# Patient Record
Sex: FEMALE | Race: WHITE | URBAN - METROPOLITAN AREA
[De-identification: names, ages, dates, MRNs, and addresses within clinical notes are randomized per-mention and may not be internally consistent; named-entity substitution may affect disease eponyms.]

---

## 2018-05-03 ENCOUNTER — DOCTOR'S OFFICE (OUTPATIENT)
Dept: URBAN - METROPOLITAN AREA CLINIC 135 | Facility: CLINIC | Age: 38
Setting detail: OPHTHALMOLOGY
End: 2018-05-03
Payer: COMMERCIAL

## 2018-05-03 DIAGNOSIS — H04.123: ICD-10-CM

## 2018-05-03 DIAGNOSIS — H52.13: ICD-10-CM

## 2018-05-03 DIAGNOSIS — H52.223: ICD-10-CM

## 2018-05-03 PROCEDURE — 92014 COMPRE OPH EXAM EST PT 1/>: CPT | Performed by: OPTOMETRIST

## 2018-05-03 ASSESSMENT — REFRACTION_AUTOREFRACTION
OD_CYLINDER: -1.50
OS_AXIS: 106
OS_SPHERE: -6.00
OD_SPHERE: -4.50
OS_CYLINDER: -0.50
OD_AXIS: 087

## 2018-05-03 ASSESSMENT — REFRACTION_MANIFEST
OD_VA3: 20/
OD_VA1: 20/
OS_VA2: 20/
OS_VA3: 20/
OS_VA3: 20/
OU_VA: 20/
OS_VA1: 20/
OS_VA1: 20/
OU_VA: 20/
OD_VA2: 20/
OD_VA3: 20/
OD_VA2: 20/
OS_VA2: 20/
OD_VA1: 20/

## 2018-05-03 ASSESSMENT — REFRACTION_OUTSIDERX
OU_VA: 20/20
OS_SPHERE: -6.75
OS_CYLINDER: -0.75
OD_VA3: 20/
OS_VA2: 20/20
OS_VA3: 20/
OS_VA1: 20/20
OS_AXIS: 110
OD_CYLINDER: -1.50
OD_VA1: 20/20
OD_AXIS: 085
OD_SPHERE: -4.75
OD_VA2: 20/20

## 2018-05-03 ASSESSMENT — SPHEQUIV_DERIVED
OD_SPHEQUIV: -5.25
OS_SPHEQUIV: -6.25

## 2018-05-03 ASSESSMENT — REFRACTION_CURRENTRX
OD_OVR_VA: 20/
OD_AXIS: 080
OS_OVR_VA: 20/
OS_OVR_VA: 20/
OS_CYLINDER: -0.75
OS_SPHERE: -6.75
OD_CYLINDER: -1.25
OD_OVR_VA: 20/
OD_SPHERE: -5.25
OS_SPHERE: -6.50
OS_AXIS: 110
OD_SPHERE: -5.25
OS_OVR_VA: 20/
OD_OVR_VA: 20/

## 2018-05-03 ASSESSMENT — TEAR BREAK UP TIME (TBUT)
OS_TBUT: 2+
OD_TBUT: 2+

## 2018-05-03 ASSESSMENT — VISUAL ACUITY
OD_BCVA: 20/20-1
OS_BCVA: 20/25

## 2018-05-03 ASSESSMENT — DECREASING TEAR LAKE - SEVERITY SCORE
OD_DEC_TEARLAKE: 1+
OS_DEC_TEARLAKE: 1+

## 2018-05-03 ASSESSMENT — CONFRONTATIONAL VISUAL FIELD TEST (CVF)
OD_FINDINGS: FULL
OS_FINDINGS: FULL

## 2018-05-04 ENCOUNTER — OPTICAL OFFICE (OUTPATIENT)
Dept: URBAN - METROPOLITAN AREA CLINIC 140 | Facility: CLINIC | Age: 38
Setting detail: OPHTHALMOLOGY
End: 2018-05-04
Payer: COMMERCIAL

## 2018-05-04 DIAGNOSIS — H52.223: ICD-10-CM

## 2018-05-04 PROCEDURE — V2107 SPHEROCYLINDER 4.25D/12-2D: HCPCS | Performed by: OPTOMETRIST

## 2018-05-04 PROCEDURE — V2783 LENS, >= 1.66 P/>=1.80 G: HCPCS | Performed by: OPTOMETRIST

## 2018-05-04 PROCEDURE — V2750 ANTI-REFLECTIVE COATING: HCPCS | Performed by: OPTOMETRIST

## 2018-05-04 PROCEDURE — V2020 VISION SVCS FRAMES PURCHASES: HCPCS | Performed by: OPTOMETRIST

## 2018-07-25 ENCOUNTER — OFFICE VISIT (OUTPATIENT)
Dept: SURGERY | Facility: CLINIC | Age: 38
End: 2018-07-25
Payer: COMMERCIAL

## 2018-07-25 VITALS
HEIGHT: 70 IN | OXYGEN SATURATION: 98 % | SYSTOLIC BLOOD PRESSURE: 118 MMHG | HEART RATE: 86 BPM | WEIGHT: 158 LBS | TEMPERATURE: 98.2 F | DIASTOLIC BLOOD PRESSURE: 78 MMHG | BODY MASS INDEX: 22.62 KG/M2

## 2018-07-25 DIAGNOSIS — N60.02 BREAST CYST, LEFT: Primary | ICD-10-CM

## 2018-07-25 PROCEDURE — 99203 OFFICE O/P NEW LOW 30 MIN: CPT | Mod: 25 | Performed by: SURGERY

## 2018-07-25 PROCEDURE — 10022 PR FINE NEEDLE ASP;W/IMAGING GUIDANCE: CPT | Performed by: SURGERY

## 2018-07-25 ASSESSMENT — ENCOUNTER SYMPTOMS
HEADACHES: 1
CARDIOVASCULAR NEGATIVE: 1
ENDOCRINE NEGATIVE: 1
COUGH: 1
CONSTITUTIONAL NEGATIVE: 1
EYES NEGATIVE: 1
CONSTIPATION: 1
MUSCULOSKELETAL NEGATIVE: 1
ALLERGIC/IMMUNOLOGIC NEGATIVE: 1
HEMATOLOGIC/LYMPHATIC NEGATIVE: 1
PSYCHIATRIC NEGATIVE: 1
DIARRHEA: 1

## 2018-07-25 NOTE — LETTER
2018     Arron Espinoza MD  [address]  [address]  [facility] [address]    Patient: Ann Rob   YOB: 1980   Date of Visit: 2018       Dear Dr. Espinoza:    Thank you for referring Ann Rob to me for evaluation. Below are my notes for this consultation.    If you have questions, please do not hesitate to call me. I look forward to following your patient along with you.         Sincerely,        Gilberto Carty MD        CC: MD Maribell Gunter MD  2018  3:55 PM  Attested  Patient ID: Ann Rob                              : 1980    Visit Date: [unfilled]  Referring Provider: No ref. provider found  PCP: Mony Dorado MD  GYN: No care team member to display    Subjective       Chief Complaint: Breast Mass (patient palpated left breast lump 07/15/2018. Patient feels lump has enlarged since.)    Ann Rob is a 37 y.o. old female presenting today for evaluation of a left breast lump which she palpated on .  She then saw her Gyn who also palpated the mass and recommended she go for evaluation by a breast surgeon.  She reported that she was told the mass was about 3 cm in size.  She also reports that she thinks the mass has gotten bigger.  She does have some tenderness at times in the area of the mass.  She denies previous breast biopsies or breast surgery. She reports a family history of breast cancer in her maternal great grandmother.  Her maternal grandmother had uterine cancer.      Breast Health:  Age at menarche: 15  Age at first live birth: 26   Age at menopause: No age on file  Breastfeeding? Stopped in May   HRT: NO  Fertility Tx: NO  OCP: NO    Medications: has a current medication list which includes the following prescription(s): calcium and multivitamin.    Allergies: has No Known Allergies.    Family History: family history includes Breast cancer in her other; Colon cancer in her father's brother; Diabetes  "in her maternal grandfather, mother's brother, and paternal grandmother; Heart disease in her father; Hypertension in her father and paternal grandfather; Prostate cancer in her father; Uterine cancer in her maternal grandmother.  Past Medical History:  has a past medical history of Gestational diabetes.  Past Surgical History:  has no past surgical history on file.  Social History:  reports that she has never smoked. She has never used smokeless tobacco. She reports that she does not drink alcohol or use drugs.       Review of Systems   Constitutional: Negative.    HENT: Negative.    Eyes: Negative.    Respiratory: Positive for cough.    Cardiovascular: Negative.    Gastrointestinal: Positive for constipation and diarrhea.   Endocrine: Negative.    Genitourinary: Negative.    Musculoskeletal: Negative.    Skin: Negative.    Allergic/Immunologic: Negative.    Neurological: Positive for headaches.   Hematological: Negative.    Psychiatric/Behavioral: Negative.      The following have been reviewed and updated as appropriate in this visit:         Objective   Vitals: /78 (BP Location: Right upper arm, Patient Position: Sitting)   Pulse 86   Temp 36.8 °C (98.2 °F) (Temporal)   Ht 1.772 m (5' 9.75\")   Wt 71.7 kg (158 lb)   LMP 07/21/2018 (Approximate)   SpO2 98%   Breastfeeding? No   BMI 22.83 kg/m²      Physical Exam   Constitutional: She is oriented to person, place, and time. She appears well-developed and well-nourished.   HENT:   Head: Normocephalic and atraumatic.   Eyes: EOM are normal. Pupils are equal, round, and reactive to light.   Neck: Normal range of motion. Neck supple.   Cardiovascular: Normal rate.    Pulmonary/Chest: Effort normal. Right breast exhibits no inverted nipple, no mass, no nipple discharge, no skin change and no tenderness. Left breast exhibits mass and tenderness. Left breast exhibits no inverted nipple, no nipple discharge and no skin change.       Musculoskeletal: Normal " range of motion.   Neurological: She is alert and oriented to person, place, and time.   Skin: Skin is warm and dry.   Vitals reviewed.         Breast Imaging: Office ultrasound performed showing 3 cm x 2 cm cyst at 4-5:00, 3 cmfn     Assessment/Plan   Problem List Items Addressed This Visit     None      Visit Diagnoses     Breast cyst, left    -  Primary        - Aspiration of the left breast cyst was performed in the office   - The patient was instructed to follow up in the office in 6 months        Return in about 6 months (around 1/25/2019).    Maribell Andino MD    Attestation signed by Gilberto Carty MD at 7/30/2018  7:42 AM:  I personally reviewed the documentation and performed a physical exam.  I agree with this assessment.

## 2018-07-25 NOTE — PROGRESS NOTES
Patient ID: Ann Rob                              : 1980    Visit Date: [unfilled]  Referring Provider: No ref. provider found  PCP: Mony Dorado MD  GYN: No care team member to display    Subjective       Chief Complaint: Breast Mass (patient palpated left breast lump 07/15/2018. Patient feels lump has enlarged since.)    Ann Rob is a 37 y.o. old female presenting today for evaluation of a left breast lump which she palpated on .  She then saw her Gyn who also palpated the mass and recommended she go for evaluation by a breast surgeon.  She reported that she was told the mass was about 3 cm in size.  She also reports that she thinks the mass has gotten bigger.  She does have some tenderness at times in the area of the mass.  She denies previous breast biopsies or breast surgery. She reports a family history of breast cancer in her maternal great grandmother.  Her maternal grandmother had uterine cancer.      Breast Health:  Age at menarche: 15  Age at first live birth: 26   Age at menopause: No age on file  Breastfeeding? Stopped in May   HRT: NO  Fertility Tx: NO  OCP: NO    Medications: has a current medication list which includes the following prescription(s): calcium and multivitamin.    Allergies: has No Known Allergies.    Family History: family history includes Breast cancer in her other; Colon cancer in her father's brother; Diabetes in her maternal grandfather, mother's brother, and paternal grandmother; Heart disease in her father; Hypertension in her father and paternal grandfather; Prostate cancer in her father; Uterine cancer in her maternal grandmother.  Past Medical History:  has a past medical history of Gestational diabetes.  Past Surgical History:  has no past surgical history on file.  Social History:  reports that she has never smoked. She has never used smokeless tobacco. She reports that she does not drink alcohol or use drugs.       Review of Systems  "  Constitutional: Negative.    HENT: Negative.    Eyes: Negative.    Respiratory: Positive for cough.    Cardiovascular: Negative.    Gastrointestinal: Positive for constipation and diarrhea.   Endocrine: Negative.    Genitourinary: Negative.    Musculoskeletal: Negative.    Skin: Negative.    Allergic/Immunologic: Negative.    Neurological: Positive for headaches.   Hematological: Negative.    Psychiatric/Behavioral: Negative.      The following have been reviewed and updated as appropriate in this visit:         Objective   Vitals: /78 (BP Location: Right upper arm, Patient Position: Sitting)   Pulse 86   Temp 36.8 °C (98.2 °F) (Temporal)   Ht 1.772 m (5' 9.75\")   Wt 71.7 kg (158 lb)   LMP 07/21/2018 (Approximate)   SpO2 98%   Breastfeeding? No   BMI 22.83 kg/m²     Physical Exam   Constitutional: She is oriented to person, place, and time. She appears well-developed and well-nourished.   HENT:   Head: Normocephalic and atraumatic.   Eyes: EOM are normal. Pupils are equal, round, and reactive to light.   Neck: Normal range of motion. Neck supple.   Cardiovascular: Normal rate.    Pulmonary/Chest: Effort normal. Right breast exhibits no inverted nipple, no mass, no nipple discharge, no skin change and no tenderness. Left breast exhibits mass and tenderness. Left breast exhibits no inverted nipple, no nipple discharge and no skin change.       Musculoskeletal: Normal range of motion.   Neurological: She is alert and oriented to person, place, and time.   Skin: Skin is warm and dry.   Vitals reviewed.         Breast Imaging: Office ultrasound performed showing 3 cm x 2 cm cyst at 4-5:00, 3 cmfn     Assessment/Plan   Problem List Items Addressed This Visit     None      Visit Diagnoses     Breast cyst, left    -  Primary        - Aspiration of the left breast cyst was performed in the office   - The patient was instructed to follow up in the office in 6 months        Return in about 6 months (around " 1/25/2019).    Maribell Andino MD

## 2018-09-14 ENCOUNTER — HOSPITAL ENCOUNTER (EMERGENCY)
Facility: HOSPITAL | Age: 38
Discharge: HOME | End: 2018-09-14
Attending: EMERGENCY MEDICINE
Payer: COMMERCIAL

## 2018-09-14 VITALS
RESPIRATION RATE: 18 BRPM | SYSTOLIC BLOOD PRESSURE: 112 MMHG | DIASTOLIC BLOOD PRESSURE: 65 MMHG | OXYGEN SATURATION: 98 % | WEIGHT: 161 LBS | HEIGHT: 69 IN | BODY MASS INDEX: 23.85 KG/M2 | TEMPERATURE: 98.1 F | HEART RATE: 88 BPM

## 2018-09-14 DIAGNOSIS — Z04.1 EXAM FOLLOWING MVC (MOTOR VEHICLE COLLISION), NO APPARENT INJURY: Primary | ICD-10-CM

## 2018-09-14 PROCEDURE — 99283 EMERGENCY DEPT VISIT LOW MDM: CPT

## 2018-09-14 PROCEDURE — 63700000 HC SELF-ADMINISTRABLE DRUG: Performed by: STUDENT IN AN ORGANIZED HEALTH CARE EDUCATION/TRAINING PROGRAM

## 2018-09-14 RX ORDER — IBUPROFEN 600 MG/1
600 TABLET ORAL ONCE
Status: COMPLETED | OUTPATIENT
Start: 2018-09-14 | End: 2018-09-14

## 2018-09-14 RX ORDER — ACETAMINOPHEN 325 MG/1
650 TABLET ORAL ONCE
Status: COMPLETED | OUTPATIENT
Start: 2018-09-14 | End: 2018-09-14

## 2018-09-14 RX ADMIN — ACETAMINOPHEN 650 MG: 325 TABLET, FILM COATED ORAL at 12:45

## 2018-09-14 RX ADMIN — IBUPROFEN 600 MG: 600 TABLET ORAL at 12:45

## 2018-09-14 ASSESSMENT — ENCOUNTER SYMPTOMS
VOMITING: 0
PALPITATIONS: 0
ABDOMINAL PAIN: 0
MYALGIAS: 1
NECK STIFFNESS: 0
ARTHRALGIAS: 1
COLOR CHANGE: 0
SEIZURES: 0
HEMATURIA: 0
FEVER: 0
CHILLS: 0
DYSURIA: 0
EYE PAIN: 0
NECK PAIN: 1
SHORTNESS OF BREATH: 0
BACK PAIN: 0
SORE THROAT: 0
COUGH: 0
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
## 2019-01-28 ENCOUNTER — OFFICE VISIT (OUTPATIENT)
Dept: SURGERY | Facility: CLINIC | Age: 39
End: 2019-01-28
Payer: COMMERCIAL

## 2019-01-28 VITALS
DIASTOLIC BLOOD PRESSURE: 78 MMHG | HEART RATE: 67 BPM | HEIGHT: 69 IN | OXYGEN SATURATION: 98 % | TEMPERATURE: 99 F | WEIGHT: 163 LBS | BODY MASS INDEX: 24.14 KG/M2 | SYSTOLIC BLOOD PRESSURE: 114 MMHG

## 2019-01-28 DIAGNOSIS — N63.0 LUMP OR MASS IN BREAST: Primary | ICD-10-CM

## 2019-01-28 PROCEDURE — 99213 OFFICE O/P EST LOW 20 MIN: CPT | Performed by: SURGERY

## 2019-02-14 ASSESSMENT — ENCOUNTER SYMPTOMS
FREQUENCY: 0
UNEXPECTED WEIGHT CHANGE: 0
ARTHRALGIAS: 0
HEADACHES: 0
FATIGUE: 0
PHOTOPHOBIA: 0
DECREASED CONCENTRATION: 0
AGITATION: 0
WHEEZING: 0
ADENOPATHY: 0
SHORTNESS OF BREATH: 0
BRUISES/BLEEDS EASILY: 0
TROUBLE SWALLOWING: 0
NERVOUS/ANXIOUS: 0
ABDOMINAL PAIN: 0
VOICE CHANGE: 0
HEMATURIA: 0
CHEST TIGHTNESS: 0
PALPITATIONS: 0
BLOOD IN STOOL: 0
STRIDOR: 0
WEAKNESS: 0
JOINT SWELLING: 0
COLOR CHANGE: 0
FLANK PAIN: 0
MYALGIAS: 0
BACK PAIN: 0
CONFUSION: 0

## 2019-02-14 NOTE — PROGRESS NOTES
Patient ID: Ann Rob                              : 1980    Visit Date: 19  Referring Provider: No ref. provider found  PCP: Norma Gill MD  GYN: No care team member to display    Subjective     Chief Complaint: f/u left breast cyst      Ann Rob is a 38 y.o. old female presenting today for follow-up for left breast lump.  The patient presented in May 2018 with a mass in her left breast in the lower outer quadrant measuring approximately 3 cm.  That time office ultrasound demonstrated a cyst corresponding with a tender mass.  Ultrasound-guided core aspiration was performed in the office with the resolution.  She now presents for short-term interval follow-up.  She is completed lactation in May 2018.  There is been no other interval change to her breast history.       Review of Systems   Constitutional: Negative for fatigue and unexpected weight change.   HENT: Negative for trouble swallowing and voice change.    Eyes: Negative for photophobia and visual disturbance.   Respiratory: Negative for chest tightness, shortness of breath, wheezing and stridor.    Cardiovascular: Negative for chest pain, palpitations and leg swelling.   Gastrointestinal: Negative for abdominal pain and blood in stool.   Endocrine: Negative for cold intolerance, heat intolerance and polyuria.   Genitourinary: Negative for flank pain, frequency and hematuria.   Musculoskeletal: Negative for arthralgias, back pain, joint swelling and myalgias.   Skin: Negative for color change and rash.   Neurological: Negative for syncope, weakness and headaches.   Hematological: Negative for adenopathy. Does not bruise/bleed easily.   Psychiatric/Behavioral: Negative for agitation, confusion and decreased concentration. The patient is not nervous/anxious.          Medications: has a current medication list which includes the following prescription(s): calcium and multivitamin.      Allergies: has No Known Allergies.    Family History:  "  Family History   Problem Relation Age of Onset   • Prostate cancer Father    • Heart disease Father    • Hypertension Father    • Uterine cancer Maternal Grandmother    • Diabetes Maternal Grandfather    • Diabetes Paternal Grandmother    • Hypertension Paternal Grandfather    • Breast cancer Other    • Colon cancer Father's Brother    • Diabetes Mother's Brother        Past Medical History:  has a past medical history of Gestational diabetes.    Past Surgical History:  has no past surgical history on file.    Social History:  reports that she has never smoked. She has never used smokeless tobacco. She reports that she does not drink alcohol or use drugs.       The following have been reviewed and updated as appropriate in this visit:  Allergies  Meds  Problems         Objective   Vitals: /78 (BP Location: Left upper arm, Patient Position: Sitting)   Pulse 67   Temp 37.2 °C (99 °F) (Temporal)   Ht 1.753 m (5' 9\")   Wt 73.9 kg (163 lb)   SpO2 98%   BMI 24.07 kg/m²     Physical Exam The patient is awake alert.  There is no supraclavicular or cervical adenopathy.  There is no scleral icterus.  There is no thyroid nodules palpable.  There is no axillary adenopathy.  The breasts are symmetrical bilaterally.  There is no dominant mass in either breast, erythema, retraction or finding concerning for malignancy.  In the lower outer left breast, there is a fullness of tissue.  This does not have suspicion for malignancy.  Ultrasound was performed for evaluation.     On ultrasound today, an area of clustering of small cysts and denser tissue was identified in the palpable area in the lower outer left breast this is approximately 4 o'clock position 3 centers of nipple.     Assessment/Plan   Problem List Items Addressed This Visit     None      Visit Diagnoses     Lump or mass in breast    -  Primary        I have recommended short-term interval follow-up with repeat ultrasound in 3 months.  I do not find an " area amenable to ultrasound aspiration in the office at this time.  There is no clinically suspicious findings for malignancy.       Return in about 3 months (around 4/28/2019).      Gilberto Carty MD

## 2019-05-09 ENCOUNTER — DOCTOR'S OFFICE (OUTPATIENT)
Dept: URBAN - METROPOLITAN AREA CLINIC 135 | Facility: CLINIC | Age: 39
Setting detail: OPHTHALMOLOGY
End: 2019-05-09
Payer: COMMERCIAL

## 2019-05-09 DIAGNOSIS — H52.223: ICD-10-CM

## 2019-05-09 DIAGNOSIS — H52.13: ICD-10-CM

## 2019-05-09 PROCEDURE — 92014 COMPRE OPH EXAM EST PT 1/>: CPT | Performed by: OPTOMETRIST

## 2019-05-09 PROCEDURE — 92310 CONTACT LENS FITTING OU: CPT | Performed by: OPTOMETRIST

## 2019-05-09 ASSESSMENT — SPHEQUIV_DERIVED
OD_SPHEQUIV: -5.5
OS_SPHEQUIV: -6.25
OS_SPHEQUIV: -7.125
OD_SPHEQUIV: -5.25

## 2019-05-09 ASSESSMENT — VISUAL ACUITY
OS_BCVA: 20/20
OD_BCVA: 20/20-1

## 2019-05-09 ASSESSMENT — REFRACTION_MANIFEST
OS_VA1: 20/
OD_AXIS: 085
OD_CYLINDER: -1.50
OD_VA2: 20/
OS_VA2: 20/
OS_VA2: 20/20
OD_VA1: 20/
OD_VA2: 20/20
OD_VA1: 20/20
OS_VA1: 20/20
OD_VA3: 20/
OS_CYLINDER: -0.75
OU_VA: 20/20
OS_VA3: 20/
OS_VA3: 20/
OU_VA: 20/
OD_VA3: 20/
OD_SPHERE: -4.75
OS_AXIS: 110
OS_SPHERE: -6.75

## 2019-05-09 ASSESSMENT — REFRACTION_CURRENTRX
OS_OVR_VA: 20/
OD_SPHERE: -5.25
OS_OVR_VA: 20/
OD_OVR_VA: 20/
OS_SPHERE: -6.50
OS_CYLINDER: -0.75
OS_SPHERE: -6.75
OD_SPHERE: -4.75
OD_OVR_VA: 20/
OD_AXIS: 085
OS_AXIS: 110
OS_OVR_VA: 20/
OD_CYLINDER: -1.50
OD_OVR_VA: 20/

## 2019-05-09 ASSESSMENT — DECREASING TEAR LAKE - SEVERITY SCORE
OD_DEC_TEARLAKE: 1+
OS_DEC_TEARLAKE: 1+

## 2019-05-09 ASSESSMENT — REFRACTION_AUTOREFRACTION
OD_AXIS: 087
OS_SPHERE: -6.00
OS_AXIS: 106
OD_SPHERE: -4.50
OS_CYLINDER: -0.50
OD_CYLINDER: -1.50

## 2019-05-09 ASSESSMENT — CONFRONTATIONAL VISUAL FIELD TEST (CVF)
OS_FINDINGS: FULL
OD_FINDINGS: FULL

## 2019-05-09 ASSESSMENT — TEAR BREAK UP TIME (TBUT)
OS_TBUT: 2+
OD_TBUT: 2+

## 2019-05-23 ENCOUNTER — DOCTOR'S OFFICE (OUTPATIENT)
Dept: URBAN - METROPOLITAN AREA CLINIC 135 | Facility: CLINIC | Age: 39
Setting detail: OPHTHALMOLOGY
End: 2019-05-23

## 2019-05-23 DIAGNOSIS — H10.13: ICD-10-CM

## 2019-05-23 PROCEDURE — NO CHARGE N/C PROFESSIONAL COURTESY: Performed by: OPTOMETRIST

## 2019-05-23 ASSESSMENT — VISUAL ACUITY
OS_BCVA: 20/20
OD_BCVA: 20/20-1

## 2019-05-23 ASSESSMENT — REFRACTION_MANIFEST
OD_VA2: 20/
OS_VA1: 20/
OS_AXIS: 110
OD_AXIS: 085
OD_VA1: 20/20
OS_VA3: 20/
OS_CYLINDER: -0.75
OD_VA3: 20/
OS_VA2: 20/
OU_VA: 20/20
OS_SPHERE: -6.75
OD_CYLINDER: -1.50
OD_VA3: 20/
OD_SPHERE: -4.75
OS_VA2: 20/20
OS_VA3: 20/
OD_VA2: 20/20
OS_VA1: 20/20
OU_VA: 20/
OD_VA1: 20/

## 2019-05-23 ASSESSMENT — REFRACTION_AUTOREFRACTION
OS_CYLINDER: -0.50
OD_AXIS: 087
OD_SPHERE: -4.50
OS_AXIS: 106
OD_CYLINDER: -1.50
OS_SPHERE: -6.00

## 2019-05-23 ASSESSMENT — REFRACTION_CURRENTRX
OS_SPHERE: -6.75
OD_SPHERE: -5.25
OS_AXIS: 110
OD_OVR_VA: 20/
OS_CYLINDER: -0.75
OD_SPHERE: -4.75
OD_OVR_VA: 20/
OS_OVR_VA: 20/
OS_OVR_VA: 20/
OS_SPHERE: -6.50
OD_AXIS: 085
OS_OVR_VA: 20/
OD_OVR_VA: 20/
OD_CYLINDER: -1.50

## 2019-05-23 ASSESSMENT — CONFRONTATIONAL VISUAL FIELD TEST (CVF)
OS_FINDINGS: FULL
OD_FINDINGS: FULL

## 2019-05-23 ASSESSMENT — TEAR BREAK UP TIME (TBUT)
OS_TBUT: 2+
OD_TBUT: 2+

## 2019-05-23 ASSESSMENT — DECREASING TEAR LAKE - SEVERITY SCORE
OS_DEC_TEARLAKE: 1+
OD_DEC_TEARLAKE: 1+

## 2019-05-23 ASSESSMENT — SPHEQUIV_DERIVED
OS_SPHEQUIV: -7.125
OD_SPHEQUIV: -5.25
OD_SPHEQUIV: -5.5
OS_SPHEQUIV: -6.25

## 2019-10-29 ENCOUNTER — OFFICE VISIT (OUTPATIENT)
Dept: SURGERY | Facility: CLINIC | Age: 39
End: 2019-10-29
Payer: COMMERCIAL

## 2019-10-29 VITALS
HEIGHT: 69 IN | HEART RATE: 81 BPM | DIASTOLIC BLOOD PRESSURE: 86 MMHG | OXYGEN SATURATION: 99 % | SYSTOLIC BLOOD PRESSURE: 124 MMHG | WEIGHT: 163 LBS | TEMPERATURE: 98.6 F | BODY MASS INDEX: 24.14 KG/M2

## 2019-10-29 DIAGNOSIS — N60.19 FIBROCYSTIC BREAST DISEASE (FCBD), UNSPECIFIED LATERALITY: Primary | ICD-10-CM

## 2019-10-29 PROCEDURE — 99213 OFFICE O/P EST LOW 20 MIN: CPT | Performed by: SURGERY

## 2019-10-29 RX ORDER — DICYCLOMINE HYDROCHLORIDE 10 MG/1
CAPSULE ORAL
Refills: 0 | COMMUNITY
Start: 2019-09-26 | End: 2021-08-11

## 2019-10-29 NOTE — PROGRESS NOTES
History and Physical Follow-up Note       Chief Complaint : The patient presents for a breast check    History of present illness: She has been seen in the past for fibrocystic breast changes.  In May 2018 she had a 3 cm breast cyst aspirated from the lower outer quadrant.  In January 2019 she had no clinical recurrence.  A repeat ultrasound  at that time revealed a cluster of small cyst.  Since that time she has had intermittent bilateral breast discomfort.  This tends to vary with her menstrual cycle.  Her periods have been somewhat irregular.  She has not noticed any new lumps or bumps on self-examination.  She has never had a mammogram.  Her family history significant for her maternal grandmother having had uterine cancer, a maternal great aunt having had breast cancer at 60, her father having had prostate cancer, and a maternal uncle having a colon cancer.  She is considering seeing the genetic counselor.  I encouraged her to do so.  Again her periods have been somewhat irregular of late..     Medical History:   Past Medical History:   Diagnosis Date   • Gestational diabetes        Surgical History: History reviewed. No pertinent surgical history.    Allergies: No Known Allergies    Home Medications:    Current Outpatient Medications:   •  CALCIUM ORAL, Take by mouth., Disp: , Rfl:   •  dicyclomine (BENTYL) 10 mg capsule, TAKE 1 CAPSULE BY ORAL ROUTE UPTO 3 TIMES AS NEEDED FOR ABDOMINAL PAIN ,, Disp: , Rfl: 0  •  multivitamin tablet, Take 1 tablet by mouth daily., Disp: , Rfl:        Vital Signs:   Vitals:    10/29/19 1128   BP: 124/86   Pulse: 81   Temp: 37 °C (98.6 °F)   SpO2: 99%       Physicial Exam  Physical Exam   Constitutional: She is oriented to person, place, and time. She appears well-developed and well-nourished.   HENT:   Head: Normocephalic.   Eyes: EOM are normal.   Neck: Normal range of motion. Neck supple.   Pulmonary/Chest: Effort normal. No respiratory distress. Right breast exhibits no  inverted nipple, no mass, no nipple discharge and no skin change. Left breast exhibits no inverted nipple, no mass, no nipple discharge and no skin change. No breast discharge. Breasts are symmetrical.   Abdominal: Soft. She exhibits no distension.   Musculoskeletal: Normal range of motion.   Neurological: She is alert and oriented to person, place, and time.   Skin: Skin is warm and dry.   Psychiatric: She has a normal mood and affect. Her behavior is normal. Judgment and thought content normal.       .  Bilateral breast palpation reveals symmetric mild to moderate fibrocystic change with no dominant lesion.  Of note there has been no recurrence of the cyst clinically that was aspirated from the lower outer quadrant of the left breast.  There is no regional lymphadenopathy.    Imaging  I have independently reviewed the patient's Imaging.    Assessment/Plan    Problem List Items Addressed This Visit        Other    Fibrocystic breast disease (FCBD) - Primary     The patient has symmetric mild to moderate fibrocystic changes.  There are no dominant lesions.  In particular there has been no recurrence of the cyst that was previously aspirated in the left breast.  She is going to go ahead with genetic screening in view of her family history.  If this is negative she knows to continue with monthly self-examinations, have a baseline mammogram in the next year to and then continue with these yearly after age 40, and continue with yearly physician examinations.  If her genetic testing is positive we will get together and discuss the situation in great detail and discuss appropriate screening.  Under the circumstances she would need an a mammogram in the near future and ultimately a breast MRI.  She understands the plan and is agreeable.                 Nasim Juarez MD  03/08/18

## 2019-10-29 NOTE — ASSESSMENT & PLAN NOTE
The patient has symmetric mild to moderate fibrocystic changes.  There are no dominant lesions.  In particular there has been no recurrence of the cyst that was previously aspirated in the left breast.  She is going to go ahead with genetic screening in view of her family history.  If this is negative she knows to continue with monthly self-examinations, have a baseline mammogram in the next year to and then continue with these yearly after age 40, and continue with yearly physician examinations.  If her genetic testing is positive we will get together and discuss the situation in great detail and discuss appropriate screening.  Under the circumstances she would need an a mammogram in the near future and ultimately a breast MRI.  She understands the plan and is agreeable.

## 2019-10-29 NOTE — LETTER
October 29, 2019     Norma Gill MD  [address]   [address]  [facility] [address]    Patient: Ann Rob  YOB: 1980  Date of Visit: 10/29/2019      Dear Dr. Gill:    Thank you for referring Ann Rob to me for evaluation. Below are my notes for this consultation.    If you have questions, please do not hesitate to call me. I look forward to following your patient along with you.         Sincerely,        Nasim Juarez MD        CC: MD Ann Moore David, MD  10/29/2019 12:31 PM  Signed   History and Physical Follow-up Note       Chief Complaint : The patient presents for a breast check    History of present illness: She has been seen in the past for fibrocystic breast changes.  In May 2018 she had a 3 cm breast cyst aspirated from the lower outer quadrant.  In January 2019 she had no clinical recurrence.  A repeat ultrasound  at that time revealed a cluster of small cyst.  Since that time she has had intermittent bilateral breast discomfort.  This tends to vary with her menstrual cycle.  Her periods have been somewhat irregular.  She has not noticed any new lumps or bumps on self-examination.  She has never had a mammogram.  Her family history significant for her maternal grandmother having had uterine cancer, a maternal great aunt having had breast cancer at 60, her father having had prostate cancer, and a maternal uncle having a colon cancer.  She is considering seeing the genetic counselor.  I encouraged her to do so.  Again her periods have been somewhat irregular of late..     Medical History:   Past Medical History:   Diagnosis Date   • Gestational diabetes        Surgical History: History reviewed. No pertinent surgical history.    Allergies: No Known Allergies    Home Medications:    Current Outpatient Medications:   •  CALCIUM ORAL, Take by mouth., Disp: , Rfl:   •  dicyclomine (BENTYL) 10 mg capsule, TAKE 1 CAPSULE BY ORAL ROUTE UPTO 3 TIMES AS NEEDED FOR ABDOMINAL  PAIN ,, Disp: , Rfl: 0  •  multivitamin tablet, Take 1 tablet by mouth daily., Disp: , Rfl:        Vital Signs:   Vitals:    10/29/19 1128   BP: 124/86   Pulse: 81   Temp: 37 °C (98.6 °F)   SpO2: 99%       Physicial Exam  Physical Exam   Constitutional: She is oriented to person, place, and time. She appears well-developed and well-nourished.   HENT:   Head: Normocephalic.   Eyes: EOM are normal.   Neck: Normal range of motion. Neck supple.   Pulmonary/Chest: Effort normal. No respiratory distress. Right breast exhibits no inverted nipple, no mass, no nipple discharge and no skin change. Left breast exhibits no inverted nipple, no mass, no nipple discharge and no skin change. No breast discharge. Breasts are symmetrical.   Abdominal: Soft. She exhibits no distension.   Musculoskeletal: Normal range of motion.   Neurological: She is alert and oriented to person, place, and time.   Skin: Skin is warm and dry.   Psychiatric: She has a normal mood and affect. Her behavior is normal. Judgment and thought content normal.       .  Bilateral breast palpation reveals symmetric mild to moderate fibrocystic change with no dominant lesion.  Of note there has been no recurrence of the cyst clinically that was aspirated from the lower outer quadrant of the left breast.  There is no regional lymphadenopathy.    Imaging  I have independently reviewed the patient's Imaging.    Assessment/Plan    Problem List Items Addressed This Visit        Other    Fibrocystic breast disease (FCBD) - Primary     The patient has symmetric mild to moderate fibrocystic changes.  There are no dominant lesions.  In particular there has been no recurrence of the cyst that was previously aspirated in the left breast.  She is going to go ahead with genetic screening in view of her family history.  If this is negative she knows to continue with monthly self-examinations, have a baseline mammogram in the next year to and then continue with these yearly  after age 40, and continue with yearly physician examinations.  If her genetic testing is positive we will get together and discuss the situation in great detail and discuss appropriate screening.  Under the circumstances she would need an a mammogram in the near future and ultimately a breast MRI.  She understands the plan and is agreeable.                 Nasim Juarez MD  03/08/18

## 2019-10-29 NOTE — PATIENT INSTRUCTIONS
As we discussed, I find nothing worrisome on your examination today.  You have symmetric fibrocystic changes.  I think your intermittent discomfort is probably related to your menstrual regularity.  Continue with monthly self-examinations.  If you notice any change please get back to me.  Go ahead with genetic counseling.  If your genetic testing is positive we should talk.  Otherwise you should continue with monthly self-examinations, have a baseline mammogram between now 140, and continue with your yearly physician examinations again do not hesitate to call with questions.

## 2021-05-14 ENCOUNTER — DOCTOR'S OFFICE (OUTPATIENT)
Dept: URBAN - METROPOLITAN AREA CLINIC 125 | Facility: CLINIC | Age: 41
Setting detail: OPHTHALMOLOGY
End: 2021-05-14
Payer: COMMERCIAL

## 2021-05-14 DIAGNOSIS — H52.223: ICD-10-CM

## 2021-05-14 DIAGNOSIS — H52.13: ICD-10-CM

## 2021-05-14 PROBLEM — H10.13 ALLERGIC CONJUNCTIVITIS; BOTH EYES: Status: ACTIVE | Noted: 2019-05-09

## 2021-05-14 PROCEDURE — 92310 CONTACT LENS FITTING OU: CPT | Performed by: OPTOMETRIST

## 2021-05-14 PROCEDURE — 92015 DETERMINE REFRACTIVE STATE: CPT | Performed by: OPTOMETRIST

## 2021-05-14 PROCEDURE — 92012 INTRM OPH EXAM EST PATIENT: CPT | Performed by: OPTOMETRIST

## 2021-05-14 ASSESSMENT — REFRACTION_MANIFEST
OD_CYLINDER: -1.50
OD_AXIS: 090
OS_VA1: 20/20
OS_AXIS: 110
OD_VA1: 20/20
OD_SPHERE: -5.00
OS_SPHERE: -6.50
OS_VA2: 20/20
OD_VA2: 20/20
OU_VA: 20/20
OS_CYLINDER: -0.75

## 2021-05-14 ASSESSMENT — REFRACTION_CURRENTRX
OD_AXIS: 085
OD_SPHERE: -5.25
OD_OVR_VA: 20/
OD_SPHERE: -4.75
OS_CYLINDER: -0.75
OD_OVR_VA: 20/
OS_SPHERE: -6.75
OS_OVR_VA: 20/
OS_OVR_VA: 20/
OS_SPHERE: -6.50
OD_CYLINDER: -1.50
OS_AXIS: 110

## 2021-05-14 ASSESSMENT — REFRACTION_AUTOREFRACTION
OS_CYLINDER: -1.00
OD_CYLINDER: -2.00
OD_SPHERE: -5.00
OS_AXIS: 110
OS_SPHERE: -6.00
OD_AXIS: 092

## 2021-05-14 ASSESSMENT — VISUAL ACUITY
OS_BCVA: 20/25-2
OD_BCVA: 20/20-2

## 2021-05-14 ASSESSMENT — DECREASING TEAR LAKE - SEVERITY SCORE
OS_DEC_TEARLAKE: 1+
OD_DEC_TEARLAKE: 1+

## 2021-05-14 ASSESSMENT — SPHEQUIV_DERIVED
OD_SPHEQUIV: -5.75
OD_SPHEQUIV: -6
OS_SPHEQUIV: -6.875
OS_SPHEQUIV: -6.5

## 2021-05-14 ASSESSMENT — TEAR BREAK UP TIME (TBUT)
OS_TBUT: 2+
OD_TBUT: 2+

## 2021-05-26 ENCOUNTER — OPTICAL OFFICE (OUTPATIENT)
Dept: URBAN - METROPOLITAN AREA CLINIC 134 | Facility: CLINIC | Age: 41
Setting detail: OPHTHALMOLOGY
End: 2021-05-26
Payer: COMMERCIAL

## 2021-05-26 DIAGNOSIS — H52.13: ICD-10-CM

## 2021-05-26 PROCEDURE — S0500 DISPOS CONT LENS: HCPCS | Performed by: OPTOMETRIST

## 2021-08-10 ENCOUNTER — TRANSCRIBE ORDERS (OUTPATIENT)
Dept: SCHEDULING | Age: 41
End: 2021-08-10

## 2021-08-10 DIAGNOSIS — Z11.59 ENCOUNTER FOR SCREENING FOR OTHER VIRAL DISEASES: Primary | ICD-10-CM

## 2021-08-11 ENCOUNTER — APPOINTMENT (EMERGENCY)
Dept: RADIOLOGY | Facility: HOSPITAL | Age: 41
DRG: 601 | End: 2021-08-11
Payer: COMMERCIAL

## 2021-08-11 ENCOUNTER — HOSPITAL ENCOUNTER (INPATIENT)
Facility: HOSPITAL | Age: 41
LOS: 1 days | Discharge: HOME | DRG: 601 | End: 2021-08-12
Attending: INTERNAL MEDICINE | Admitting: INTERNAL MEDICINE
Payer: COMMERCIAL

## 2021-08-11 DIAGNOSIS — R07.89 ATYPICAL CHEST PAIN: ICD-10-CM

## 2021-08-11 DIAGNOSIS — N60.19 FIBROCYSTIC BREAST DISEASE (FCBD), UNSPECIFIED LATERALITY: Primary | ICD-10-CM

## 2021-08-11 PROBLEM — R07.9 CHEST PAIN: Status: ACTIVE | Noted: 2021-08-11

## 2021-08-11 LAB
ALBUMIN SERPL-MCNC: 4.3 G/DL (ref 3.4–5)
ALP SERPL-CCNC: 43 IU/L (ref 35–126)
ALT SERPL-CCNC: 31 IU/L (ref 11–54)
ANION GAP SERPL CALC-SCNC: 11 MEQ/L (ref 3–15)
AST SERPL-CCNC: 28 IU/L (ref 15–41)
BASOPHILS # BLD: 0.05 K/UL (ref 0.01–0.1)
BASOPHILS NFR BLD: 0.7 %
BILIRUB SERPL-MCNC: 0.6 MG/DL (ref 0.3–1.2)
BUN SERPL-MCNC: 12 MG/DL (ref 8–20)
CALCIUM SERPL-MCNC: 9.6 MG/DL (ref 8.9–10.3)
CHLORIDE SERPL-SCNC: 98 MEQ/L (ref 98–109)
CHOLEST SERPL-MCNC: 230 MG/DL
CO2 SERPL-SCNC: 26 MEQ/L (ref 22–32)
CREAT SERPL-MCNC: 0.8 MG/DL (ref 0.6–1.1)
DIFFERENTIAL METHOD BLD: NORMAL
EOSINOPHIL # BLD: 0.16 K/UL (ref 0.04–0.36)
EOSINOPHIL NFR BLD: 2.3 %
ERYTHROCYTE [DISTWIDTH] IN BLOOD BY AUTOMATED COUNT: 13.1 % (ref 11.7–14.4)
GFR SERPL CREATININE-BSD FRML MDRD: >60 ML/MIN/1.73M*2
GLUCOSE SERPL-MCNC: 87 MG/DL (ref 70–99)
HCT VFR BLDCO AUTO: 39.8 % (ref 35–45)
HDLC SERPL-MCNC: 48 MG/DL
HDLC SERPL: 4.8 {RATIO}
HGB BLD-MCNC: 12.9 G/DL (ref 11.8–15.7)
IMM GRANULOCYTES # BLD AUTO: 0.03 K/UL (ref 0–0.08)
IMM GRANULOCYTES NFR BLD AUTO: 0.4 %
LDLC SERPL CALC-MCNC: 144 MG/DL
LYMPHOCYTES # BLD: 2.37 K/UL (ref 1.2–3.5)
LYMPHOCYTES NFR BLD: 33.5 %
MCH RBC QN AUTO: 29.3 PG (ref 28–33.2)
MCHC RBC AUTO-ENTMCNC: 32.4 G/DL (ref 32.2–35.5)
MCV RBC AUTO: 90.5 FL (ref 83–98)
MONOCYTES # BLD: 0.39 K/UL (ref 0.28–0.8)
MONOCYTES NFR BLD: 5.5 %
NEUTROPHILS # BLD: 4.08 K/UL (ref 1.7–7)
NEUTS SEG NFR BLD: 57.6 %
NONHDLC SERPL-MCNC: 182 MG/DL
NRBC BLD-RTO: 0 %
PDW BLD AUTO: 9.6 FL (ref 9.4–12.3)
PLATELET # BLD AUTO: 273 K/UL (ref 150–369)
POTASSIUM SERPL-SCNC: 3.6 MEQ/L (ref 3.6–5.1)
PROT SERPL-MCNC: 7.5 G/DL (ref 6–8.2)
RBC # BLD AUTO: 4.4 M/UL (ref 3.93–5.22)
SARS-COV-2 RNA RESP QL NAA+PROBE: NEGATIVE
SODIUM SERPL-SCNC: 135 MEQ/L (ref 136–144)
TRIGL SERPL-MCNC: 188 MG/DL (ref 30–149)
TROPONIN I SERPL-MCNC: <0.02 NG/ML
WBC # BLD AUTO: 7.08 K/UL (ref 3.8–10.5)

## 2021-08-11 PROCEDURE — 63700000 HC SELF-ADMINISTRABLE DRUG: Performed by: INTERNAL MEDICINE

## 2021-08-11 PROCEDURE — 80061 LIPID PANEL: CPT | Performed by: INTERNAL MEDICINE

## 2021-08-11 PROCEDURE — U0003 INFECTIOUS AGENT DETECTION BY NUCLEIC ACID (DNA OR RNA); SEVERE ACUTE RESPIRATORY SYNDROME CORONAVIRUS 2 (SARS-COV-2) (CORONAVIRUS DISEASE [COVID-19]), AMPLIFIED PROBE TECHNIQUE, MAKING USE OF HIGH THROUGHPUT TECHNOLOGIES AS DESCRIBED BY CMS-2020-01-R: HCPCS | Performed by: PHYSICIAN ASSISTANT

## 2021-08-11 PROCEDURE — 21400000 HC ROOM AND CARE CCU/INTERMEDIATE

## 2021-08-11 PROCEDURE — 36415 COLL VENOUS BLD VENIPUNCTURE: CPT

## 2021-08-11 PROCEDURE — 99285 EMERGENCY DEPT VISIT HI MDM: CPT | Mod: 25

## 2021-08-11 PROCEDURE — 93005 ELECTROCARDIOGRAM TRACING: CPT | Performed by: INTERNAL MEDICINE

## 2021-08-11 PROCEDURE — 85025 COMPLETE CBC W/AUTO DIFF WBC: CPT | Performed by: INTERNAL MEDICINE

## 2021-08-11 PROCEDURE — 93005 ELECTROCARDIOGRAM TRACING: CPT

## 2021-08-11 PROCEDURE — 84484 ASSAY OF TROPONIN QUANT: CPT | Performed by: INTERNAL MEDICINE

## 2021-08-11 PROCEDURE — 80053 COMPREHEN METABOLIC PANEL: CPT | Performed by: INTERNAL MEDICINE

## 2021-08-11 PROCEDURE — 80053 COMPREHEN METABOLIC PANEL: CPT

## 2021-08-11 PROCEDURE — 85025 COMPLETE CBC W/AUTO DIFF WBC: CPT

## 2021-08-11 PROCEDURE — 71046 X-RAY EXAM CHEST 2 VIEWS: CPT

## 2021-08-11 PROCEDURE — 84484 ASSAY OF TROPONIN QUANT: CPT

## 2021-08-11 RX ORDER — ATROPINE SULFATE 0.1 MG/ML
0.5 INJECTION INTRAVENOUS EVERY 5 MIN PRN
Status: DISCONTINUED | OUTPATIENT
Start: 2021-08-11 | End: 2021-08-12 | Stop reason: HOSPADM

## 2021-08-11 RX ORDER — CEFUROXIME AXETIL 500 MG/1
500 TABLET ORAL 2 TIMES DAILY
COMMUNITY
Start: 2021-08-06 | End: 2021-08-12 | Stop reason: HOSPADM

## 2021-08-11 RX ORDER — POTASSIUM CHLORIDE 750 MG/1
20 TABLET, FILM COATED, EXTENDED RELEASE ORAL AS NEEDED
Status: DISCONTINUED | OUTPATIENT
Start: 2021-08-11 | End: 2021-08-12 | Stop reason: HOSPADM

## 2021-08-11 RX ORDER — POTASSIUM CHLORIDE 14.9 MG/ML
20 INJECTION INTRAVENOUS AS NEEDED
Status: DISCONTINUED | OUTPATIENT
Start: 2021-08-11 | End: 2021-08-12 | Stop reason: HOSPADM

## 2021-08-11 RX ORDER — METOPROLOL SUCCINATE 25 MG/1
25 TABLET, EXTENDED RELEASE ORAL DAILY
COMMUNITY
Start: 2021-08-17 | End: 2021-08-12 | Stop reason: HOSPADM

## 2021-08-11 RX ORDER — METOPROLOL SUCCINATE 25 MG/1
25 TABLET, EXTENDED RELEASE ORAL DAILY
Status: DISCONTINUED | OUTPATIENT
Start: 2021-08-11 | End: 2021-08-12

## 2021-08-11 RX ORDER — CIPROFLOXACIN 500 MG/1
500 TABLET ORAL
COMMUNITY
Start: 2021-05-19 | End: 2021-08-11 | Stop reason: ENTERED-IN-ERROR

## 2021-08-11 RX ORDER — SENNOSIDES 8.6 MG/1
1 TABLET ORAL 2 TIMES DAILY PRN
Status: DISCONTINUED | OUTPATIENT
Start: 2021-08-11 | End: 2021-08-12 | Stop reason: HOSPADM

## 2021-08-11 RX ORDER — IBUPROFEN 200 MG
16-32 TABLET ORAL AS NEEDED
Status: DISCONTINUED | OUTPATIENT
Start: 2021-08-11 | End: 2021-08-12 | Stop reason: HOSPADM

## 2021-08-11 RX ORDER — DEXTROSE 40 %
15-30 GEL (GRAM) ORAL AS NEEDED
Status: DISCONTINUED | OUTPATIENT
Start: 2021-08-11 | End: 2021-08-12 | Stop reason: HOSPADM

## 2021-08-11 RX ORDER — ACETAMINOPHEN 325 MG/1
650 TABLET ORAL EVERY 4 HOURS PRN
Status: DISCONTINUED | OUTPATIENT
Start: 2021-08-11 | End: 2021-08-12 | Stop reason: HOSPADM

## 2021-08-11 RX ORDER — ASPIRIN 81 MG/1
81 TABLET ORAL DAILY
COMMUNITY
End: 2021-08-12 | Stop reason: HOSPADM

## 2021-08-11 RX ORDER — POTASSIUM CHLORIDE 750 MG/1
40 TABLET, FILM COATED, EXTENDED RELEASE ORAL AS NEEDED
Status: DISCONTINUED | OUTPATIENT
Start: 2021-08-11 | End: 2021-08-12 | Stop reason: HOSPADM

## 2021-08-11 RX ORDER — FLUTICASONE PROPIONATE 50 MCG
1 SPRAY, SUSPENSION (ML) NASAL EVERY MORNING
COMMUNITY
End: 2021-08-12 | Stop reason: HOSPADM

## 2021-08-11 RX ORDER — NITROGLYCERIN 0.4 MG/1
0.4 TABLET SUBLINGUAL EVERY 5 MIN PRN
Status: DISCONTINUED | OUTPATIENT
Start: 2021-08-11 | End: 2021-08-12 | Stop reason: HOSPADM

## 2021-08-11 RX ORDER — NAPROXEN SODIUM 220 MG/1
81 TABLET, FILM COATED ORAL DAILY
Status: DISCONTINUED | OUTPATIENT
Start: 2021-08-11 | End: 2021-08-12

## 2021-08-11 RX ORDER — DEXTROSE 50 % IN WATER (D50W) INTRAVENOUS SYRINGE
25 AS NEEDED
Status: DISCONTINUED | OUTPATIENT
Start: 2021-08-11 | End: 2021-08-12 | Stop reason: HOSPADM

## 2021-08-11 RX ADMIN — METOPROLOL SUCCINATE 25 MG: 25 TABLET, EXTENDED RELEASE ORAL at 16:12

## 2021-08-11 RX ADMIN — ASPIRIN 81 MG CHEWABLE TABLET 81 MG: 81 TABLET CHEWABLE at 15:42

## 2021-08-11 RX ADMIN — ACETAMINOPHEN 650 MG: 325 TABLET ORAL at 21:55

## 2021-08-11 ASSESSMENT — COGNITIVE AND FUNCTIONAL STATUS - GENERAL
HELP NEEDED FOR PERSONAL GROOMING: 4 - NONE
WALKING IN HOSPITAL ROOM: 4 - NONE
MOVING TO AND FROM BED TO CHAIR: 4 - NONE
STANDING UP FROM CHAIR USING ARMS: 4 - NONE
EATING MEALS: 4 - NONE
DRESSING REGULAR LOWER BODY CLOTHING: 4 - NONE
HELP NEEDED FOR BATHING: 4 - NONE
CLIMB 3 TO 5 STEPS WITH RAILING: 4 - NONE
TOILETING: 4 - NONE
DRESSING REGULAR UPPER BODY CLOTHING: 4 - NONE

## 2021-08-11 ASSESSMENT — PATIENT HEALTH QUESTIONNAIRE - PHQ9: SUM OF ALL RESPONSES TO PHQ9 QUESTIONS 1 & 2: 0

## 2021-08-11 NOTE — Clinical Note
Patient placed on procedure table in supine position with left arm extended. Positioning devices: arm board under arms and safety strap applied.

## 2021-08-11 NOTE — PROGRESS NOTES
CARDIOLOGY CONSULT NOTE    REASON FOR CONSULT: CP    CONSULT FROM: No att. providers found    HPI:  She is a very pleasant 40-year-old woman with history of palpitations, high cholesterol, family history of psoriatic arthritis, who I am seeing for chest pain.    She is a good historian.  She has baseline fair performance status given her age.  She is able to walk outside with her 4-year-old daughter without chest pain shortness of breath or palpitations.  She is a longtime patient of Dr. Kenney.  She has had prior monitors in the context of palpitations described as an elevated heart rate when she is stressed.  No clear cause has been found.  She has had a recent event monitor but this was worn during her vacation and she was not stressed at all.    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  She has had increasing hours.  She reports evidence of shoulder pain neck pain arm pain which seems to wander during the course of the episode.  These episodes classically occur in the evening and associated elevated heart rates.  These episodes are not occurring with exercise.    She had a vasodilator nuclear stress test performed.  This was reportedly abnormal.  She had a left heart catheterization scheduled after discussion with the physician.  Because she had persistent symptoms she called our office and was referred to the emergency room.    She is chest pain-free currently and had a good day this morning.  She asks pertinent questions regarding her care.  PAST MEDICAL & SURGICAL HISTORY:  Past Medical History:   Diagnosis Date   • Abnormal stress test 08/01/2021   • Allergic rhinitis    • Gestational diabetes    Strongly suspect anxiety.  History reviewed. No pertinent surgical history.    MEDICATIONS:  Aspirin 81 mg daily  Metoprolol 25 mg daily.    ALLERGIES:  Patient has no known allergies.    SOCIAL HISTORY:   2 kids.  Works.    FAMILY HISTORY:  Father with heart disease but later  in life.    REVIEW OF SYSTEMS:  Constitutional: denies weight gain and weight loss  HEENT: denies blurred vision and irritation sore throat and hoarseness  Respiratory: denies dyspnea on exertion and chest pain/tightness  Cardiovascular: Palpitations atypical chest pain.  Gastrointestinal: denies nausea, vomiting and diarrhea  Genitourinary: denies painful urination and frequency  Integument: denies itching and dryness  Hematologic/lymphatic:  denies easy bruising and petechiae  Musculoskeletal: denies arthalgias, myalgias  Neurological: denies vertigo and tremors  Behavioral/Psych: denies anxiety and depression  Endocrine: denies cold intolerance and heat intolerance    PHYSICAL EXAM:  Temp:  [36.4 °C (97.5 °F)] 36.4 °C (97.5 °F)  Heart Rate:  [73] 73  Resp:  [15] 15  BP: (120)/(79) 120/79  No intake or output data in the 24 hours ending 08/11/21 1526  Physical exam:  General appearance: alert, appears stated age and cooperative  Head: without obvious abnormality  Eyes: PERRLA, extraocular movements intact  Neck: no JVD, carotid bruits, thyromegaly  Lungs: clear to auscultation bilaterally, no crackles or wheezing  Heart: regular rate and rhythm, S1-S2 normal, no murmurs, clicks, rubs or gallops  Abdomen: soft, non-tender; bowel sounds normal; no masses  Extremities: no edema, peripheral pulses present  Skin: Skin color, texture, turgor normal. No rashes or lesions  Neurologic: Alert and oriented X 3, no focal deficits    LABS:   Results from last 7 days   Lab Units 08/11/21  1409   SODIUM mEQ/L 135*   POTASSIUM mEQ/L 3.6   CHLORIDE mEQ/L 98   CO2 mEQ/L 26   BUN mg/dL 12   CREATININE mg/dL 0.8   AST IU/L 28   ALT IU/L 31   TROPONIN I ng/mL <0.02     Results from last 7 days   Lab Units 08/11/21  1409   WBC K/uL 7.08   HEMOGLOBIN g/dL 12.9   HEMATOCRIT % 39.8   PLATELETS K/uL 273     No results found for: HGBA1C, TSH  No results found for: CHOL, LDLCALC, HDL, TRIG  No results found for: BNP    IMAGING:  Stress test  report reviewed.    ECG:   Pending.    TELEMETRY:  Currently normal.    PROBLEM LIST:  Active Problems:  No Active Problems: There are no active problems currently on the Problem List. Please update the Problem List and refresh.      ASSESSMENT AND PLAN:    She is a very pleasant 40-year-old woman with history of palpitations, high cholesterol, family history of psoriatic arthritis, who I am seeing for chest pain.    With regards for her chest pain, this is atypical.  She does have hyperlipidemia.  She has had an abnormal stress test.  She and her doctor had a goals of care discussion.  It was decided to plan on a left heart catheterization as this is the gold standard of care.  She would like to remain in the hospital with a left heart catheterization tomorrow.  This is certainly reasonable.    We will start aspirin.  We will start a beta-blocker.  We will check a lipid panel while she is here.  I would favor a chest x-ray as well.  She will have complete Covid testing as well.    My suspicion for ischemic heart disease is not high.    She has no IV dye allergy.  Her current rhythm is normal.  We will continue to follow.  If you have any questions or concerns, please do not hesitate to call.    Chang Bhatt MD [facility]  [facility]        Primary Care Physician: Norma Gill MD

## 2021-08-11 NOTE — ED PROVIDER NOTES
Emergency Medicine Note  HPI   HISTORY OF PRESENT ILLNESS     40-year-old female with past medical history of abnormal stress test, gestational diabetes presents with left-sided chest pain.  Patient states she has chest pain most days of night.  States last night it was really bothersome.  Distant she also felt a rapid heart rate which has not followed well.  Also pain in her right arm and the right side of her neck.  Today it went away.  Around midday she had pain in left arm which is now gone.  States the pain has been very intermittent.  States she does have some right-sided chest pain right now that is a 2 out of 10.  Patient has been under a lot of stress.  Patient had a stress test done about a week and a half ago by Dr. Ruiz because of this chest pain and rapid heart rate.  Stress up with abnormal and patient was scheduled for cardiac catheterization.  Patient called cardiology today and was referred to the ER.  Denies fever, chills, lightheadedness, dizziness, headache, shortness of breath, abdominal pain, nausea.            Patient History   PAST HISTORY     Reviewed from Nursing Triage:      Past Medical History:   Diagnosis Date   • Abnormal stress test 08/01/2021   • Allergic rhinitis    • Gestational diabetes        History reviewed. No pertinent surgical history.    Family History   Problem Relation Age of Onset   • Prostate cancer Biological Father    • Heart disease Biological Father    • Hypertension Biological Father    • Uterine cancer Maternal Grandmother    • Diabetes Maternal Grandfather    • Diabetes Paternal Grandmother    • Hypertension Paternal Grandfather    • Breast cancer Other    • Colon cancer Father's Brother    • Diabetes Mother's Brother        Social History     Tobacco Use   • Smoking status: Never Smoker   • Smokeless tobacco: Never Used   Substance Use Topics   • Alcohol use: No   • Drug use: No         Review of Systems   REVIEW OF SYSTEMS     Review of Systems    Constitutional: Negative for diaphoresis and fever.   Respiratory: Negative for cough and shortness of breath.    Cardiovascular: Positive for chest pain.   Gastrointestinal: Negative for abdominal pain, diarrhea and vomiting.   Skin: Negative for color change.   Neurological: Negative for weakness, light-headedness, numbness and headaches.         VITALS     ED Vitals    Date/Time Temp Pulse Resp BP SpO2 [facility]   08/11/21 1820 -- 71 18 100/63 98 % CGJ   08/11/21 1717 -- 70 18 107/64 97 % CGJ   08/11/21 1614 -- 80 15 106/75 99 % LMS   08/11/21 1536 -- 70 14 105/69 100 % LMS   08/11/21 1406 36.4 °C (97.5 °F) 73 15 120/79 100 % KLM        Pulse Ox %: 98 % (08/11/21 1430)  Pulse Ox Interpretation: Normal (08/11/21 1430)  Heart Rate: 70 (08/11/21 1430)  Rhythm Strip Interpretation: Normal Sinus Rhythm (08/11/21 1430)     Physical Exam   PHYSICAL EXAM     Physical Exam  Vitals and nursing note reviewed.   Constitutional:       General: She is not in acute distress.     Appearance: She is well-developed.   HENT:      Head: Atraumatic.   Eyes:      Conjunctiva/sclera: Conjunctivae normal.   Cardiovascular:      Rate and Rhythm: Normal rate and regular rhythm.   Pulmonary:      Effort: Pulmonary effort is normal.      Breath sounds: Normal breath sounds.   Musculoskeletal:         General: No deformity. Normal range of motion.   Skin:     General: Skin is warm and dry.   Neurological:      General: No focal deficit present.      Mental Status: She is alert and oriented to person, place, and time.   Psychiatric:         Behavior: Behavior normal.           PROCEDURES     Procedures     DATA     Results     Procedure Component Value Units Date/Time    [place] Draw Panel [[id number]] Collected: 08/11/21 1409    Specimen: Blood, Venous Updated: 08/11/21 2300    Narrative:      The following orders were created for panel order [place] Draw Panel.  Procedure                               Abnormality         Status                      ---------                               -----------         ------                     RAINBOW RED[[id number]]                                      Final result               CAROLINE CHAVIS[[id number]]                                     Final result                 Please view results for these tests on the individual orders.    RAINBOW RED [[id number]] Collected: 08/11/21 1409    Specimen: Blood, Venous Updated: 08/11/21 2300    CAROLINE GOLD [[id number]] Collected: 08/11/21 1409    Specimen: Blood, Venous Updated: 08/11/21 2300    SARS-CoV-2 (COVID-19), PCR Nasopharynx [[id number]]  (Normal) Collected: 08/11/21 1612    Specimen: Nasopharyngeal Swab from Nasopharynx Updated: 08/11/21 1817    Narrative:      The following orders were created for panel order SARS-CoV-2 (COVID-19), PCR Nasopharynx.  Procedure                               Abnormality         Status                     ---------                               -----------         ------                     SARS-CoV-2 (COVID-19), P...[[id number]]  Normal              Final result                 Please view results for these tests on the individual orders.    SARS-CoV-2 (COVID-19), PCR Nasopharynx [[id number]]  (Normal) Collected: 08/11/21 1612    Specimen: Nasopharyngeal Swab from Nasopharynx Updated: 08/11/21 1817     SARS-CoV-2 (COVID-19) Negative    Troponin I [[id number]]  (Normal) Collected: 08/11/21 1409    Specimen: Blood, Venous Updated: 08/11/21 1445     Troponin I <0.02 ng/mL     Comprehensive metabolic panel [[id number]]  (Abnormal) Collected: 08/11/21 1409    Specimen: Blood, Venous Updated: 08/11/21 1434     Sodium 135 mEQ/L      Potassium 3.6 mEQ/L      Comment: Results obtained on plasma. Plasma Potassium values may be up to 0.4 mEQ/L less than serum values. The differences may be greater for patients with high platelet or white cell counts.        Chloride 98 mEQ/L      CO2 26 mEQ/L      BUN 12 mg/dL      Creatinine 0.8 mg/dL      Glucose 87 mg/dL       Calcium 9.6 mg/dL      AST (SGOT) 28 IU/L      ALT (SGPT) 31 IU/L      Alkaline Phosphatase 43 IU/L      Total Protein 7.5 g/dL      Comment: Test performed on plasma which typically contains approximately 0.4 g/dL more protein than serum.        Albumin 4.3 g/dL      Bilirubin, Total 0.6 mg/dL      eGFR >60.0 mL/min/1.73m*2      Anion Gap 11 mEQ/L     CBC and differential [[id number]] Collected: 08/11/21 1409    Specimen: Blood, Venous Updated: 08/11/21 1423     WBC 7.08 K/uL      RBC 4.40 M/uL      Hemoglobin 12.9 g/dL      Hematocrit 39.8 %      MCV 90.5 fL      MCH 29.3 pg      MCHC 32.4 g/dL      RDW 13.1 %      Platelets 273 K/uL      MPV 9.6 fL      Differential Type Auto     nRBC 0.0 %      Immature Granulocytes 0.4 %      Neutrophils 57.6 %      Lymphocytes 33.5 %      Monocytes 5.5 %      Eosinophils 2.3 %      Basophils 0.7 %      Immature Granulocytes, Absolute 0.03 K/uL      Neutrophils, Absolute 4.08 K/uL      Lymphocytes, Absolute 2.37 K/uL      Monocytes, Absolute 0.39 K/uL      Eosinophils, Absolute 0.16 K/uL      Basophils, Absolute 0.05 K/uL           Imaging Results          X-RAY CHEST 2 VIEWS (Final result)  Result time 08/11/21 16:08:47    Final result                 Impression:    IMPRESSION: No evidence of active cardiopulmonary disease    COMPARISON: None  available.    COMMENT: PA and lateral views of the chest show the lungs are normally expanded  and clear.  The heart is normal in size.  Normal pulmonary vasculature.  Unremarkable hilar and mediastinal contours.  Unremarkable bony thorax, imaged upper abdomen.             Narrative:    CLINICAL HISTORY: Chest pain                                ECG 12 lead   ED Interpretation   Normal sinus rhythm rate 70 bpm normal axis narrow QRS no ST segment changes      ECG 12 lead    (Results Pending)       Scoring tools                                 ED Course & MDM   MDM / ED COURSE and CLINICAL IMPRESSIONS     MDM    ED Course as of Aug 12  0025   Wed Aug 11, 2021   1547 Cardiology to admit     [DB]      ED Course User Index  [DB] Antoinette Baker PA C         Clinical Impressions as of Aug 12 0025   Atypical chest pain            Antoinette Baker PA C  08/12/21 0025

## 2021-08-11 NOTE — Clinical Note
The bilateral groins and left radial was site clipped, marked  and prepped with ChloraPrep. The patient was draped in a sterile fashion after allowing for the recommended dry time.

## 2021-08-11 NOTE — ED ATTESTATION NOTE
-----------------------------------------------------------  ED Attending Note  8/11/2021, 4:11 PM    I supervised care provided by the PA (Samuel). We have discussed the case. I have reviewed their note and agree with the plan of treatment. I personally interviewed the patient and examined the patient.    HISTORY OF PRESENT ILLNESS     Chief Complaint   Patient presents with   • Chest Pain     Ann Rob is a 40 y.o. female with the PMH below who comes to the ED today with the chief complaint of 2/10 chest pain that radiates to her left arm, intermittent palpitations.  Patient had a abnormal stress test 2 weeks ago and is scheduled to undergo a cardiac catheterization.  She called her cardiologist PTA due to symptoms and was advised to come to the ED for further evaluation and admission with plan to expedite her catheterization.    PAST HISTORY     Past Medical History:   Diagnosis Date   • Abnormal stress test 08/01/2021   • Allergic rhinitis    • Gestational diabetes      Patient Active Problem List   Diagnosis   • Fibrocystic breast disease (FCBD)   • Chest pain     History reviewed. No pertinent surgical history.    VITAL SIGNS     ED Vitals    Date/Time Temp Pulse Resp BP SpO2 [facility]   08/11/21 1536 -- 70 14 105/69 100 % LMS   08/11/21 1406 36.4 °C (97.5 °F) 73 15 120/79 100 % KLM          PHYSICAL EXAM   Physical Exam  Vitals reviewed.   Constitutional:       General: She is not in acute distress.  HENT:      Head: Normocephalic and atraumatic.   Cardiovascular:      Rate and Rhythm: Normal rate and regular rhythm.      Heart sounds: Normal heart sounds.   Pulmonary:      Effort: Pulmonary effort is normal. No respiratory distress.      Breath sounds: Normal breath sounds.   Neurological:      Mental Status: She is alert.         DATA     Vital sign review: SpO2: 100 % on room air. Interpretation: normal    Results     Procedure Component Value Units Date/Time    SARS-CoV-2 (COVID-19), PCR Nasopharynx  [[id number]]  (Normal) Collected: 08/11/21 1612    Specimen: Nasopharyngeal Swab from Nasopharynx Updated: 08/11/21 1817    Narrative:      The following orders were created for panel order SARS-CoV-2 (COVID-19), PCR Nasopharynx.  Procedure                               Abnormality         Status                     ---------                               -----------         ------                     SARS-CoV-2 (COVID-19), P...[[id number]]  Normal              Final result                 Please view results for these tests on the individual orders.    SARS-CoV-2 (COVID-19), PCR Nasopharynx [[id number]]  (Normal) Collected: 08/11/21 1612    Specimen: Nasopharyngeal Swab from Nasopharynx Updated: 08/11/21 1817     SARS-CoV-2 (COVID-19) Negative    Troponin I [[id number]]  (Normal) Collected: 08/11/21 1409    Specimen: Blood, Venous Updated: 08/11/21 1445     Troponin I <0.02 ng/mL     Comprehensive metabolic panel [[id number]]  (Abnormal) Collected: 08/11/21 1409    Specimen: Blood, Venous Updated: 08/11/21 1434     Sodium 135 mEQ/L      Potassium 3.6 mEQ/L      Comment: Results obtained on plasma. Plasma Potassium values may be up to 0.4 mEQ/L less than serum values. The differences may be greater for patients with high platelet or white cell counts.        Chloride 98 mEQ/L      CO2 26 mEQ/L      BUN 12 mg/dL      Creatinine 0.8 mg/dL      Glucose 87 mg/dL      Calcium 9.6 mg/dL      AST (SGOT) 28 IU/L      ALT (SGPT) 31 IU/L      Alkaline Phosphatase 43 IU/L      Total Protein 7.5 g/dL      Comment: Test performed on plasma which typically contains approximately 0.4 g/dL more protein than serum.        Albumin 4.3 g/dL      Bilirubin, Total 0.6 mg/dL      eGFR >60.0 mL/min/1.73m*2      Anion Gap 11 mEQ/L     CBC and differential [[id number]] Collected: 08/11/21 1409    Specimen: Blood, Venous Updated: 08/11/21 1423     WBC 7.08 K/uL      RBC 4.40 M/uL      Hemoglobin 12.9 g/dL      Hematocrit 39.8 %      MCV 90.5 fL       MCH 29.3 pg      MCHC 32.4 g/dL      RDW 13.1 %      Platelets 273 K/uL      MPV 9.6 fL      Differential Type Auto     nRBC 0.0 %      Immature Granulocytes 0.4 %      Neutrophils 57.6 %      Lymphocytes 33.5 %      Monocytes 5.5 %      Eosinophils 2.3 %      Basophils 0.7 %      Immature Granulocytes, Absolute 0.03 K/uL      Neutrophils, Absolute 4.08 K/uL      Lymphocytes, Absolute 2.37 K/uL      Monocytes, Absolute 0.39 K/uL      Eosinophils, Absolute 0.16 K/uL      Basophils, Absolute 0.05 K/uL     [place] Draw Panel [[id number]] Collected: 08/11/21 1409    Specimen: Blood, Venous Updated: 08/11/21 1416    Narrative:      The following orders were created for panel order [place] Draw Panel.  Procedure                               Abnormality         Status                     ---------                               -----------         ------                     RAINBOW RED[[id number]]                                      In process                 CAROLINE CHAVIS[[id number]]                                     In process                   Please view results for these tests on the individual orders.    CAROLINE GOLD [[id number]] Collected: 08/11/21 1409    Specimen: Blood, Venous Updated: 08/11/21 1416    CAROLINE RED [[id number]] Collected: 08/11/21 1409    Specimen: Blood, Venous Updated: 08/11/21 1415        ECG 12 lead   ED Interpretation   Normal sinus rhythm rate 70 bpm normal axis narrow QRS no ST segment changes        Imaging Results          X-RAY CHEST 2 VIEWS (Final result)  Result time 08/11/21 16:08:47    Final result                 Impression:    IMPRESSION: No evidence of active cardiopulmonary disease    COMPARISON: None  available.    COMMENT: PA and lateral views of the chest show the lungs are normally expanded  and clear.  The heart is normal in size.  Normal pulmonary vasculature.  Unremarkable hilar and mediastinal contours.  Unremarkable bony thorax, imaged upper abdomen.              Narrative:    CLINICAL HISTORY: Chest pain                                 MDM / ED COURSE and CLINICAL IMPRESSIONS     ED Course as of Aug 11 1611   Wed Aug 11, 2021   1547 Cardiology to admit     [DB]      ED Course User Index  [DB] Antoinette Baker PA C         Clinical Impressions as of Aug 11 1611   Atypical chest pain       -----------------------------  Diane Morales MD  8/11/2021, 4:11 PM  -----------------------------------------------------------     Diane Morales MD  08/14/21 0027

## 2021-08-12 VITALS
TEMPERATURE: 97.5 F | HEIGHT: 69 IN | SYSTOLIC BLOOD PRESSURE: 90 MMHG | HEART RATE: 75 BPM | DIASTOLIC BLOOD PRESSURE: 58 MMHG | BODY MASS INDEX: 24.44 KG/M2 | WEIGHT: 165 LBS | OXYGEN SATURATION: 99 % | RESPIRATION RATE: 16 BRPM

## 2021-08-12 LAB
ALBUMIN SERPL-MCNC: 4 G/DL (ref 3.4–5)
ALP SERPL-CCNC: 40 IU/L (ref 35–126)
ALT SERPL-CCNC: 29 IU/L (ref 11–54)
ANION GAP SERPL CALC-SCNC: 8 MEQ/L (ref 3–15)
APTT PPP: 25 SEC (ref 23–35)
AST SERPL-CCNC: 23 IU/L (ref 15–41)
ATRIAL RATE: 70
BASOPHILS # BLD: 0.03 K/UL (ref 0.01–0.1)
BASOPHILS NFR BLD: 0.4 %
BILIRUB SERPL-MCNC: 0.4 MG/DL (ref 0.3–1.2)
BUN SERPL-MCNC: 14 MG/DL (ref 8–20)
CALCIUM SERPL-MCNC: 9.2 MG/DL (ref 8.9–10.3)
CATH EF ESTIMATED: 70 %
CHLORIDE SERPL-SCNC: 104 MEQ/L (ref 98–109)
CO2 SERPL-SCNC: 25 MEQ/L (ref 22–32)
CREAT SERPL-MCNC: 0.8 MG/DL (ref 0.6–1.1)
DIFFERENTIAL METHOD BLD: NORMAL
EOSINOPHIL # BLD: 0.21 K/UL (ref 0.04–0.36)
EOSINOPHIL NFR BLD: 2.9 %
ERYTHROCYTE [DISTWIDTH] IN BLOOD BY AUTOMATED COUNT: 13.2 % (ref 11.7–14.4)
GFR SERPL CREATININE-BSD FRML MDRD: >60 ML/MIN/1.73M*2
GLUCOSE SERPL-MCNC: 102 MG/DL (ref 70–99)
HCG UR QL: NEGATIVE
HCT VFR BLDCO AUTO: 38 % (ref 35–45)
HGB BLD-MCNC: 12.5 G/DL (ref 11.8–15.7)
IMM GRANULOCYTES # BLD AUTO: 0.02 K/UL (ref 0–0.08)
IMM GRANULOCYTES NFR BLD AUTO: 0.3 %
INR PPP: 1
LYMPHOCYTES # BLD: 2.51 K/UL (ref 1.2–3.5)
LYMPHOCYTES NFR BLD: 34.7 %
MCH RBC QN AUTO: 29.4 PG (ref 28–33.2)
MCHC RBC AUTO-ENTMCNC: 32.9 G/DL (ref 32.2–35.5)
MCV RBC AUTO: 89.4 FL (ref 83–98)
MONOCYTES # BLD: 0.5 K/UL (ref 0.28–0.8)
MONOCYTES NFR BLD: 6.9 %
NEUTROPHILS # BLD: 3.97 K/UL (ref 1.7–7)
NEUTS SEG NFR BLD: 54.8 %
NRBC BLD-RTO: 0 %
P AXIS: 54
PDW BLD AUTO: 9.5 FL (ref 9.4–12.3)
PLATELET # BLD AUTO: 265 K/UL (ref 150–369)
POTASSIUM SERPL-SCNC: 4 MEQ/L (ref 3.6–5.1)
PR INTERVAL: 118
PROT SERPL-MCNC: 6.7 G/DL (ref 6–8.2)
PROTHROMBIN TIME: 12.5 SEC (ref 12.2–14.5)
QRS DURATION: 76
QT INTERVAL: 396
QTC CALCULATION(BAZETT): 427
R AXIS: 12
RBC # BLD AUTO: 4.25 M/UL (ref 3.93–5.22)
SODIUM SERPL-SCNC: 137 MEQ/L (ref 136–144)
T WAVE AXIS: 44
VENTRICULAR RATE: 70
WBC # BLD AUTO: 7.24 K/UL (ref 3.8–10.5)

## 2021-08-12 PROCEDURE — 63700000 HC SELF-ADMINISTRABLE DRUG: Performed by: INTERNAL MEDICINE

## 2021-08-12 PROCEDURE — 85730 THROMBOPLASTIN TIME PARTIAL: CPT | Performed by: INTERNAL MEDICINE

## 2021-08-12 PROCEDURE — 84703 CHORIONIC GONADOTROPIN ASSAY: CPT | Performed by: NURSE PRACTITIONER

## 2021-08-12 PROCEDURE — 85025 COMPLETE CBC W/AUTO DIFF WBC: CPT | Performed by: INTERNAL MEDICINE

## 2021-08-12 PROCEDURE — 80053 COMPREHEN METABOLIC PANEL: CPT | Performed by: INTERNAL MEDICINE

## 2021-08-12 PROCEDURE — 85610 PROTHROMBIN TIME: CPT | Performed by: INTERNAL MEDICINE

## 2021-08-12 PROCEDURE — 25000000 HC PHARMACY GENERAL: Performed by: INTERNAL MEDICINE

## 2021-08-12 PROCEDURE — 63600105 HC IODINE BASED CONTRAST: Mod: JW | Performed by: INTERNAL MEDICINE

## 2021-08-12 PROCEDURE — C1769 GUIDE WIRE: HCPCS | Performed by: INTERNAL MEDICINE

## 2021-08-12 PROCEDURE — 63600000 HC DRUGS/DETAIL CODE: Performed by: INTERNAL MEDICINE

## 2021-08-12 PROCEDURE — 71000001 HC PACU PHASE 1 INITIAL 30MIN: Performed by: INTERNAL MEDICINE

## 2021-08-12 PROCEDURE — B2111ZZ FLUOROSCOPY OF MULTIPLE CORONARY ARTERIES USING LOW OSMOLAR CONTRAST: ICD-10-PCS | Performed by: INTERNAL MEDICINE

## 2021-08-12 PROCEDURE — 36415 COLL VENOUS BLD VENIPUNCTURE: CPT | Performed by: INTERNAL MEDICINE

## 2021-08-12 PROCEDURE — 93458 L HRT ARTERY/VENTRICLE ANGIO: CPT | Performed by: INTERNAL MEDICINE

## 2021-08-12 PROCEDURE — 71000011 HC PACU PHASE 1 EA ADDL MIN: Performed by: INTERNAL MEDICINE

## 2021-08-12 PROCEDURE — 27200000 HC STERILE SUPPLY: Performed by: INTERNAL MEDICINE

## 2021-08-12 PROCEDURE — 4A023N7 MEASUREMENT OF CARDIAC SAMPLING AND PRESSURE, LEFT HEART, PERCUTANEOUS APPROACH: ICD-10-PCS | Performed by: INTERNAL MEDICINE

## 2021-08-12 PROCEDURE — C1894 INTRO/SHEATH, NON-LASER: HCPCS | Performed by: INTERNAL MEDICINE

## 2021-08-12 RX ORDER — NICARDIPINE HCL-0.9% SOD CHLOR 1 MG/10 ML
SYRINGE (ML) INTRAVENOUS AS NEEDED
Status: DISCONTINUED | OUTPATIENT
Start: 2021-08-12 | End: 2021-08-12 | Stop reason: HOSPADM

## 2021-08-12 RX ORDER — FENTANYL CITRATE 50 UG/ML
INJECTION, SOLUTION INTRAMUSCULAR; INTRAVENOUS AS NEEDED
Status: DISCONTINUED | OUTPATIENT
Start: 2021-08-12 | End: 2021-08-12 | Stop reason: HOSPADM

## 2021-08-12 RX ORDER — LIDOCAINE HYDROCHLORIDE 10 MG/ML
INJECTION, SOLUTION INFILTRATION; PERINEURAL AS NEEDED
Status: DISCONTINUED | OUTPATIENT
Start: 2021-08-12 | End: 2021-08-12 | Stop reason: HOSPADM

## 2021-08-12 RX ORDER — HEPARIN SODIUM 1000 [USP'U]/ML
INJECTION, SOLUTION INTRAVENOUS; SUBCUTANEOUS AS NEEDED
Status: DISCONTINUED | OUTPATIENT
Start: 2021-08-12 | End: 2021-08-12 | Stop reason: HOSPADM

## 2021-08-12 RX ORDER — MIDAZOLAM HYDROCHLORIDE 2 MG/2ML
INJECTION, SOLUTION INTRAMUSCULAR; INTRAVENOUS AS NEEDED
Status: DISCONTINUED | OUTPATIENT
Start: 2021-08-12 | End: 2021-08-12 | Stop reason: HOSPADM

## 2021-08-12 RX ADMIN — ACETAMINOPHEN 650 MG: 325 TABLET ORAL at 16:16

## 2021-08-12 RX ADMIN — ASPIRIN 81 MG CHEWABLE TABLET 81 MG: 81 TABLET CHEWABLE at 08:30

## 2021-08-12 ASSESSMENT — ENCOUNTER SYMPTOMS
HEADACHES: 0
NUMBNESS: 0
WEAKNESS: 0
DIAPHORESIS: 0
DIARRHEA: 0
FEVER: 0
COLOR CHANGE: 0
ABDOMINAL PAIN: 0
COUGH: 0
LIGHT-HEADEDNESS: 0
SHORTNESS OF BREATH: 0
VOMITING: 0

## 2021-08-12 NOTE — DISCHARGE SUMMARY
CARDIOLOGY DISCHARGE SUMMARY    Admission Date: 8/11/2021     Discharge Date: 8/12/2021    Hospital course:  Ann Rob is a 40 y.o. female who was admitted with atypical chest pain. She has a history of palpitations, high cholesterol, family history of psoriatic arthritis. She has had an abnormal stress test. She and her primary cardiologist, Dr. Kenney, had planned for an outpatient cardiac catheterization. Her chest pain persisted so she presented to [facility] on 8/11. She underwent a left heart catheterization today which revealed patent coronary arteries with normal LVEF of 70%. She has had no further chest pain. She is stable for discharge home today with close follow up in our office in 1-2 weeks.       Pertinent studies:  Left heart cath 8/12/2021:  · Patent coronary artreies  · Normal LVEDP  · Left ventricular systolic function is normal.  · Left ventricular ejection fraction is approximately 70%.    ----------------------------------------------------------------------------------------------------------------------  VITAL SIGNS:  Temp:  [36.6 °C (97.8 °F)-36.8 °C (98.3 °F)] 36.8 °C (98.3 °F)  Heart Rate:  [] 56  Resp:  [12-21] 14  BP: ()/(52-84) 95/55    Intake/Output Summary (Last 24 hours) at 8/12/2021 1443  Last data filed at 8/12/2021 1219  Gross per 24 hour   Intake 605 ml   Output 3 ml   Net 602 ml       PHYSICAL EXAM:  General appearance: alert, appears stated age and cooperative  Head: without obvious abnormality  Eyes: PERRLA, extraocular movements intact  Neck: No JVD, carotid bruits, thyromegaly  Lungs: clear to auscultation bilaterally, no crackles or wheezing  Heart: regular rate and rhythm, S1-S2 normal, no murmurs, clicks, rubs or gallops  Abdomen: soft, non-tender; bowel sounds normal; no masses  Extremities: no edema, peripheral pulses present  Skin: Skin color, texture, turgor normal. No rashes or lesions  Neurologic: Alert and oriented X 3, no focal  deficits    ----------------------------------------------------------------------------------------------------------------------  Lab Results:  Results from last 7 days   Lab Units 08/12/21  0358 08/11/21  1409   SODIUM mEQ/L 137 135*   POTASSIUM mEQ/L 4.0 3.6   CHLORIDE mEQ/L 104 98   CO2 mEQ/L 25 26   BUN mg/dL 14 12   CREATININE mg/dL 0.8 0.8   AST IU/L 23 28   ALT IU/L 29 31   TROPONIN I ng/mL  --  <0.02     Results from last 7 days   Lab Units 08/12/21  0358 08/11/21  1409   WBC K/uL 7.24 7.08   HEMOGLOBIN g/dL 12.5 12.9   HEMATOCRIT % 38.0 39.8   PLATELETS K/uL 265 273   INR  1.0  --      No results found for: HGBA1C, TSH  Lab Results   Component Value Date    CHOL 230 (H) 08/11/2021    LDLCALC 144 (H) 08/11/2021    HDL 48 (L) 08/11/2021    TRIG 188 (H) 08/11/2021     No results found for: BNP    ----------------------------------------------------------------------------------------------------------------------

## 2021-08-12 NOTE — PROGRESS NOTES
CARDIOLOGY PROGRESS NOTE          ASSESSMENT AND PLAN:      She is a very pleasant 40-year-old woman with history of palpitations, high cholesterol, family history of psoriatic arthritis, who I am seeing for chest pain.       CP: not angina. Normal cors. Suspicion for syndrome x very low.    Palpitations: not likely to be an arrymthmia    Ok to dc tonight    MDS      PROBLEM LIST:  Principal Problem:    Fibrocystic breast disease (FCBD)  Active Problems:    Chest pain       SUBJECTIVE:    No CP post [facility]  OBJECTIVE:  Vital signs in last 24 hours:  Temp:  [36.4 °C (97.5 °F)-36.8 °C (98.3 °F)] 36.4 °C (97.5 °F)  Heart Rate:  [] 75  Resp:  [12-21] 16  BP: ()/(52-84) 90/58    Intake/Output Summary (Last 24 hours) at 8/12/2021 1716  Last data filed at 8/12/2021 1219  Gross per 24 hour   Intake 605 ml   Output 3 ml   Net 602 ml       PHYSICAL EXAMINATION:  General appearance: alert, appears stated age and cooperative  Head: without obvious abnormality  Eyes: PERRLA, extraocular movements intact  Neck: No JVD, carotid bruits, thyromegaly  Lungs: clear to auscultation bilaterally, no crackles or wheezing  Heart: regular rate and rhythm, S1-S2 normal, no murmurs, clicks, rubs or gallops  Abdomen: soft, non-tender; bowel sounds normal; no masses  Extremities: no edema, peripheral pulses present  Skin: Skin color, texture, turgor normal. No rashes or lesions  Neurologic: Alert and oriented X 3, no focal deficits    LABS:  Results from last 7 days   Lab Units 08/12/21  0358 08/11/21  1409   SODIUM mEQ/L 137 135*   POTASSIUM mEQ/L 4.0 3.6   CHLORIDE mEQ/L 104 98   CO2 mEQ/L 25 26   BUN mg/dL 14 12   CREATININE mg/dL 0.8 0.8   AST IU/L 23 28   ALT IU/L 29 31   TROPONIN I ng/mL  --  <0.02     Results from last 7 days   Lab Units 08/12/21  0358 08/11/21  1409   WBC K/uL 7.24 7.08   HEMOGLOBIN g/dL 12.5 12.9   HEMATOCRIT % 38.0 39.8   PLATELETS K/uL 265 273   INR  1.0  --      No results found for: HGBA1C, TSH  Lab  Results   Component Value Date    CHOL 230 (H) 08/11/2021    HDL 48 (L) 08/11/2021    TRIG 188 (H) 08/11/2021     @No results found for: PROBNP    IMAGING:      TELEMETRY:ivonne Bhatt MD [facility]  8/12/2021    Primary Care Doctor: Norma Gill MD

## 2021-08-12 NOTE — PLAN OF CARE
Plan of Care Review  Plan of Care Reviewed With: patient  Progress: no change  Outcome Summary: Pt was admitted to unit from ED around 2000. Pt had c/o headache, cardiology made aware- new orders for prn tylenol, given with relief. VSS. Pt was in SR on the monitor overnight, with no events. Pt is independent in the room, refuses bed alarm, otherwise fall precautions in place. Pt has been NPO since midnight for cath lab later today.

## 2021-08-12 NOTE — DISCHARGE INSTRUCTIONS
Follow up with Dr. Norman in 1 week    Precautions/instructions following cardiac cath via radial artery/wrist:  -NO DRIVING FOR 24 HOURS  --Limit use of affected arm for 24 hrs  --No lifting anything greater than 5lbs for 3 days with affected wrist  --Remove dressing the following day.   - Keep site clean and dry  --Avoid submerging wrist in sitting water (tub, hot tub, pool, dish-washing, ocean) for 7 days.   - You may shower  --Avoid activities where the wrist may get heavily soiled (such as gardening, housecleaning etc) for 7 days.  --Check the site daily, call physician if you notice swelling, redness, drainage, increased discomfort, new numbness or fever greater than 101F  --IF BLEEDING OCCURS:  ---sit and apply firm pressure to site with your fingers for 10 minutes. If bleeding stops, continue to sit quietly keeping your wrist straight for 2 hrs and notify your physician as soon as possible.  ---If bleeding does not stop after 10 minutes or if there is a large amount of bleeding or spurting call 911 IMMEDIATELY, DO NOT DRIVE YOURSELF TO THE HOSPITAL

## 2021-08-12 NOTE — PLAN OF CARE
Problem: Adult Inpatient Plan of Care  Goal: Readiness for Transition of Care  Intervention: Mutually Develop Transition Plan  Flowsheets (Taken 8/12/2021 1017)  Equipment Needed After Discharge: none  Assistive Device/Animal Currently Used at Home: none  Anticipated Changes Related to Illness: none  Transportation Concerns: car, none  Readmission Within the Last 30 Days: no previous admission in last 30 days  Patient/Family Anticipated Services at Transition: none  Patient/Family Anticipates Transition to: home with family  Transportation Anticipated: family or friend will provide  Concerns to be Addressed:   care coordination/care conferences   discharge planning   8/12/2021 10:18 AM  chart reviewed.  CCRN remote and spoke w pt via telephone. CCRN role introduced.  Pt lives at home w her  and 3 yo.   She is independent, drives and works FT.   PCP and pharm confirmed.   Pt does not anticipate any dc needs.    She is for cardiac cath tomorrow.  DC plan is likely home w no needs when medically stable. iPng [place] RN

## 2021-08-12 NOTE — NURSING NOTE
Discharge instructions given to patient.  Pt verbalized understanding of instructions.  IV d/c.  Tele box d/c.  Pt d/c home in stable condition.

## 2021-08-12 NOTE — PRE-PROCEDURE NOTE
Cardiac Cath Lab Pre-procedure Note    - Patient was seen and examined at bedside.  - The patient's chart and all data was reviewed.  - The procedure, treatment alternatives, risks and benefits were explained with specific risks discussed.  - Patient was consented for cardiac cath procedure and possible PCI.  - Patient's case was found appropriate for dual antiplatelet therapy.    Patient's clinical presentation to the cardiac cath lab: unstable angina.       Patient appears to be well.     Notable Non-Invasive Cardiac Testing  - Stress Test: positive nuclear stress test, risk for ischemia: low, date: 7/23/2021.         Total anti-anginal medications: 1.         Pre-sedation assessment  ASA 3   Mallampati class: II - soft palate, uvula, fauces visible.

## 2021-12-06 ENCOUNTER — OPTICAL OFFICE (OUTPATIENT)
Dept: URBAN - METROPOLITAN AREA CLINIC 134 | Facility: CLINIC | Age: 41
Setting detail: OPHTHALMOLOGY
End: 2021-12-06

## 2021-12-06 DIAGNOSIS — H52.13: ICD-10-CM

## 2021-12-06 PROCEDURE — S0500 DISPOS CONT LENS: HCPCS | Performed by: OPHTHALMOLOGY

## 2022-10-05 ENCOUNTER — HOSPITAL ENCOUNTER (EMERGENCY)
Facility: HOSPITAL | Age: 42
Discharge: HOME | End: 2022-10-05
Attending: EMERGENCY MEDICINE
Payer: COMMERCIAL

## 2022-10-05 ENCOUNTER — APPOINTMENT (EMERGENCY)
Dept: RADIOLOGY | Facility: HOSPITAL | Age: 42
End: 2022-10-05
Payer: COMMERCIAL

## 2022-10-05 VITALS
WEIGHT: 167 LBS | BODY MASS INDEX: 24.73 KG/M2 | TEMPERATURE: 97.5 F | RESPIRATION RATE: 18 BRPM | HEIGHT: 69 IN | OXYGEN SATURATION: 100 % | HEART RATE: 88 BPM | SYSTOLIC BLOOD PRESSURE: 110 MMHG | DIASTOLIC BLOOD PRESSURE: 88 MMHG

## 2022-10-05 DIAGNOSIS — R07.89 CHEST WALL PAIN: Primary | ICD-10-CM

## 2022-10-05 LAB
ALBUMIN SERPL-MCNC: 4.7 G/DL (ref 3.4–5)
ALP SERPL-CCNC: 49 IU/L (ref 35–126)
ALT SERPL-CCNC: 17 IU/L (ref 11–54)
ANION GAP SERPL CALC-SCNC: 9 MEQ/L (ref 3–15)
AST SERPL-CCNC: 21 IU/L (ref 15–41)
BASOPHILS # BLD: 0.05 K/UL (ref 0.01–0.1)
BASOPHILS NFR BLD: 0.7 %
BILIRUB SERPL-MCNC: 0.3 MG/DL (ref 0.3–1.2)
BUN SERPL-MCNC: 11 MG/DL (ref 8–20)
CALCIUM SERPL-MCNC: 9.2 MG/DL (ref 8.9–10.3)
CHLORIDE SERPL-SCNC: 99 MEQ/L (ref 98–109)
CO2 SERPL-SCNC: 28 MEQ/L (ref 22–32)
CREAT SERPL-MCNC: 0.8 MG/DL (ref 0.6–1.1)
DIFFERENTIAL METHOD BLD: ABNORMAL
EOSINOPHIL # BLD: 0.23 K/UL (ref 0.04–0.36)
EOSINOPHIL NFR BLD: 3.1 %
ERYTHROCYTE [DISTWIDTH] IN BLOOD BY AUTOMATED COUNT: 13.2 % (ref 11.7–14.4)
FLUAV RNA SPEC QL NAA+PROBE: NEGATIVE
FLUBV RNA SPEC QL NAA+PROBE: NEGATIVE
GFR SERPL CREATININE-BSD FRML MDRD: >60 ML/MIN/1.73M*2
GLUCOSE SERPL-MCNC: 97 MG/DL (ref 70–99)
HCG UR QL: NEGATIVE
HCT VFR BLDCO AUTO: 42.8 % (ref 35–45)
HGB BLD-MCNC: 14 G/DL (ref 11.8–15.7)
IMM GRANULOCYTES # BLD AUTO: 0.02 K/UL (ref 0–0.08)
IMM GRANULOCYTES NFR BLD AUTO: 0.3 %
LYMPHOCYTES # BLD: 2.44 K/UL (ref 1.2–3.5)
LYMPHOCYTES NFR BLD: 32.8 %
MCH RBC QN AUTO: 28.6 PG (ref 28–33.2)
MCHC RBC AUTO-ENTMCNC: 32.7 G/DL (ref 32.2–35.5)
MCV RBC AUTO: 87.5 FL (ref 83–98)
MONOCYTES # BLD: 0.53 K/UL (ref 0.28–0.8)
MONOCYTES NFR BLD: 7.1 %
NEUTROPHILS # BLD: 4.18 K/UL (ref 1.7–7)
NEUTS SEG NFR BLD: 56 %
NRBC BLD-RTO: 0 %
PDW BLD AUTO: 9.3 FL (ref 9.4–12.3)
PLATELET # BLD AUTO: 299 K/UL (ref 150–369)
POTASSIUM SERPL-SCNC: 3.4 MEQ/L (ref 3.6–5.1)
PROT SERPL-MCNC: 7.8 G/DL (ref 6–8.2)
RBC # BLD AUTO: 4.89 M/UL (ref 3.93–5.22)
RSV RNA SPEC QL NAA+PROBE: NEGATIVE
SARS-COV-2 RNA RESP QL NAA+PROBE: NEGATIVE
SODIUM SERPL-SCNC: 136 MEQ/L (ref 136–144)
TROPONIN I SERPL HS-MCNC: <2 PG/ML
WBC # BLD AUTO: 7.45 K/UL (ref 3.8–10.5)

## 2022-10-05 PROCEDURE — 85025 COMPLETE CBC W/AUTO DIFF WBC: CPT

## 2022-10-05 PROCEDURE — 84703 CHORIONIC GONADOTROPIN ASSAY: CPT | Performed by: PHYSICIAN ASSISTANT

## 2022-10-05 PROCEDURE — 84484 ASSAY OF TROPONIN QUANT: CPT

## 2022-10-05 PROCEDURE — 80053 COMPREHEN METABOLIC PANEL: CPT | Performed by: EMERGENCY MEDICINE

## 2022-10-05 PROCEDURE — 82040 ASSAY OF SERUM ALBUMIN: CPT

## 2022-10-05 PROCEDURE — 85025 COMPLETE CBC W/AUTO DIFF WBC: CPT | Performed by: EMERGENCY MEDICINE

## 2022-10-05 PROCEDURE — 93005 ELECTROCARDIOGRAM TRACING: CPT

## 2022-10-05 PROCEDURE — 93005 ELECTROCARDIOGRAM TRACING: CPT | Performed by: EMERGENCY MEDICINE

## 2022-10-05 PROCEDURE — 99283 EMERGENCY DEPT VISIT LOW MDM: CPT | Mod: 25

## 2022-10-05 PROCEDURE — 87637 SARSCOV2&INF A&B&RSV AMP PRB: CPT | Performed by: PHYSICIAN ASSISTANT

## 2022-10-05 PROCEDURE — 71046 X-RAY EXAM CHEST 2 VIEWS: CPT

## 2022-10-05 PROCEDURE — 84484 ASSAY OF TROPONIN QUANT: CPT | Performed by: EMERGENCY MEDICINE

## 2022-10-05 PROCEDURE — 36415 COLL VENOUS BLD VENIPUNCTURE: CPT

## 2022-10-05 RX ORDER — KETOROLAC TROMETHAMINE 15 MG/ML
15 INJECTION, SOLUTION INTRAMUSCULAR; INTRAVENOUS ONCE
Status: DISCONTINUED | OUTPATIENT
Start: 2022-10-05 | End: 2022-10-05 | Stop reason: HOSPADM

## 2022-10-05 ASSESSMENT — ENCOUNTER SYMPTOMS
SHORTNESS OF BREATH: 0
HEMATURIA: 0
DYSURIA: 0
SEIZURES: 0
ARTHRALGIAS: 0
FEVER: 0
PALPITATIONS: 0
EYE PAIN: 0
BACK PAIN: 0
ABDOMINAL PAIN: 0
COUGH: 0
SORE THROAT: 0
CHILLS: 0
COLOR CHANGE: 0
VOMITING: 0

## 2022-10-05 NOTE — ED PROVIDER NOTES
Emergency Medicine Note  HPI   HISTORY OF PRESENT ILLNESS     HPI     41-year-old female presents for evaluation of chest wall pain for 4 days.  She states that her chest is tender to palpation, but is worsened over the past 2 days.  She admits to some congestion and postnasal drip.  She also admits to recent COVID exposure but had a negative rapid test 4 days ago.  As pain had not improved, patient came to the ED for further evaluation and testing.  She states the 3 out of 10 pain is intermittent but has been becoming more constant and has no alleviating factors.  She admits to associated fatigue.    Denies fever/chills, shortness of breath, nausea, vomiting, blood in her stool, back pain, any other symptoms.  Patient denies any recent travel, calf pain, surgery, trauma, history of DVT/PE, history of coagulopathy.  She does not use oral contraceptives or take estrogen.      Patient History   PAST HISTORY     Reviewed from Nursing Triage:         Past Medical History:   Diagnosis Date    Abnormal stress test 08/01/2021    Allergic rhinitis     Gestational diabetes        History reviewed. No pertinent surgical history.    Family History   Problem Relation Age of Onset    Prostate cancer Biological Father     Heart disease Biological Father     Hypertension Biological Father     Uterine cancer Maternal Grandmother     Diabetes Maternal Grandfather     Diabetes Paternal Grandmother     Hypertension Paternal Grandfather     Breast cancer Other     Colon cancer Father's Brother     Diabetes Mother's Brother        Social History     Tobacco Use    Smoking status: Never Smoker    Smokeless tobacco: Never Used   Substance Use Topics    Alcohol use: No    Drug use: No         Review of Systems   REVIEW OF SYSTEMS     Review of Systems   Constitutional: Negative for chills and fever.   HENT: Positive for congestion. Negative for ear pain and sore throat.    Eyes: Negative for pain and visual disturbance.    Respiratory: Negative for cough and shortness of breath.    Cardiovascular: Positive for chest pain. Negative for palpitations.   Gastrointestinal: Negative for abdominal pain and vomiting.   Genitourinary: Negative for dysuria and hematuria.   Musculoskeletal: Negative for arthralgias and back pain.   Skin: Negative for color change and rash.   Neurological: Negative for seizures and syncope.   All other systems reviewed and are negative.        VITALS     ED Vitals    Date/Time Temp Pulse Resp BP SpO2 [facility]   10/05/22 1917 36.4 °C (97.5 °F) 70 16 143/79 99 % SDB   10/05/22 1915 -- -- 16 -- -- SDB        Pulse Ox %: 99 % (10/05/22 2013)  Pulse Ox Interpretation: Normal (10/05/22 2013)  Heart Rate: 98 (10/05/22 2013)  Rhythm Strip Interpretation: Normal Sinus Rhythm (10/05/22 2013)     Physical Exam   PHYSICAL EXAM     Physical Exam  Vitals and nursing note reviewed.   Constitutional:       General: She is not in acute distress.     Appearance: She is well-developed.   HENT:      Head: Normocephalic and atraumatic.   Eyes:      Conjunctiva/sclera: Conjunctivae normal.   Cardiovascular:      Rate and Rhythm: Normal rate and regular rhythm.      Heart sounds: No murmur heard.  Pulmonary:      Effort: Pulmonary effort is normal. No respiratory distress.      Breath sounds: Normal breath sounds.   Chest:      Chest wall: Tenderness (costchondral areas bilaterally) present.   Abdominal:      Palpations: Abdomen is soft.      Tenderness: There is no abdominal tenderness.   Musculoskeletal:      Cervical back: Neck supple.   Skin:     General: Skin is warm and dry.   Neurological:      Mental Status: She is alert.           PROCEDURES     Procedures     DATA     Results     Procedure Component Value Units Date/Time    SARS-CoV-2 (COVID-19), PCR Nasopharynx [[id number]]  (Normal) Collected: 10/05/22 2003    Specimen: Nasopharyngeal Swab from Nasopharynx Updated: 10/05/22 2059    Narrative:      The following orders were  created for panel order SARS-CoV-2 (COVID-19), PCR Nasopharynx.  Procedure                               Abnormality         Status                     ---------                               -----------         ------                     SARS-COV-2 (COVID-19)/ F...[[id number]]  Normal              Final result                 Please view results for these tests on the individual orders.    SARS-COV-2 (COVID-19)/ FLU A/B, AND RSV, PCR Nasopharynx [[id number]]  (Normal) Collected: 10/05/22 2003    Specimen: Nasopharyngeal Swab from Nasopharynx Updated: 10/05/22 2059     SARS-CoV-2 (COVID-19) Negative     Influenza A Negative     Influenza B Negative     Respiratory Syncytial Virus Negative    Narrative:      Testing performed using real-time PCR for detection of COVID-19. EUA approved validation studies performed on site.     CG, Serum, Qual [[id number]]  (Normal) Collected: 10/05/22 1920    Specimen: Blood, Venous Updated: 10/05/22 2023     Preg Test, Serum Negative    HS Troponin I (with 2 hour reflex) [[id number]]  (Normal) Collected: 10/05/22 1920    Specimen: Blood, Venous Updated: 10/05/22 2016     High Sens Troponin I <2 pg/mL     Comprehensive metabolic panel [[id number]]  (Abnormal) Collected: 10/05/22 1920    Specimen: Blood, Venous Updated: 10/05/22 1957     Sodium 136 mEQ/L      Potassium 3.4 mEQ/L      Comment: Results obtained on plasma. Plasma Potassium values may be up to 0.4 mEQ/L less than serum values. The differences may be greater for patients with high platelet or white cell counts.        Chloride 99 mEQ/L      CO2 28 mEQ/L      BUN 11 mg/dL      Creatinine 0.8 mg/dL      Glucose 97 mg/dL      Calcium 9.2 mg/dL      AST (SGOT) 21 IU/L      ALT (SGPT) 17 IU/L      Alkaline Phosphatase 49 IU/L      Total Protein 7.8 g/dL      Comment: Test performed on plasma which typically contains approximately 0.4 g/dL more protein than serum.        Albumin 4.7 g/dL      Bilirubin, Total 0.3 mg/dL      eGFR  >60.0 mL/min/1.73m*2      Anion Gap 9 mEQ/L     CBC and differential [[id number]]  (Abnormal) Collected: 10/05/22 1920    Specimen: Blood, Venous Updated: 10/05/22 1940     WBC 7.45 K/uL      RBC 4.89 M/uL      Hemoglobin 14.0 g/dL      Hematocrit 42.8 %      MCV 87.5 fL      MCH 28.6 pg      MCHC 32.7 g/dL      RDW 13.2 %      Platelets 299 K/uL      MPV 9.3 fL      Differential Type Auto     nRBC 0.0 %      Immature Granulocytes 0.3 %      Neutrophils 56.0 %      Lymphocytes 32.8 %      Monocytes 7.1 %      Eosinophils 3.1 %      Basophils 0.7 %      Immature Granulocytes, Absolute 0.02 K/uL      Neutrophils, Absolute 4.18 K/uL      Lymphocytes, Absolute 2.44 K/uL      Monocytes, Absolute 0.53 K/uL      Eosinophils, Absolute 0.23 K/uL      Basophils, Absolute 0.05 K/uL     RAINBOW LT BLUE [[id number]] Collected: 10/05/22 1920    Specimen: Blood, Venous Updated: 10/05/22 1929    RAINBOW GOLD [[id number]] Collected: 10/05/22 1920    Specimen: Blood, Venous Updated: 10/05/22 1929    [place] Draw Panel [[id number]] Collected: 10/05/22 1920    Specimen: Blood, Venous Updated: 10/05/22 1928    Narrative:      The following orders were created for panel order [place] Draw Panel.  Procedure                               Abnormality         Status                     ---------                               -----------         ------                     RAINBOW RED[[id number]]                                      In process                 RAINBOW LT BLUE[[id number]]                                  In process                 RAINBOW GOLD[[id number]]                                     In process                   Please view results for these tests on the individual orders.    RAINBOW RED [[id number]] Collected: 10/05/22 1920    Specimen: Blood, Venous Updated: 10/05/22 1928          Imaging Results          X-RAY CHEST 2 VIEWS (Final result)  Result time 10/05/22 19:58:14    Final result                 Impression:     IMPRESSION:  No acute cardiopulmonary process.               Narrative:    CLINICAL HISTORY:      Shortness of breath and chest pain    COMPARISON:  08/11/2021    COMMENT:  Two views of the chest were performed.    The cardiovascular silhouette is within normal limits. Costophrenic angles are  clear. No focal consolidation or pneumothorax.                                ECG 12 lead   ED Interpretation   Reviewed with attending.     Rhythm: [NSR]  Rate: 74  P waves: [normal interval]  QRS: [normal QRS]  Axis: [normal]  ST Segments: [no obvious ST elevation or ischemia]    Impression: Normal sinus rhythm; normal ECG          Scoring tools                                  ED Course & MDM   MDM / ED COURSE / CLINICAL IMPRESSION / DISPO     [facility]    ED Course as of 10/05/22 2117   Wed Oct 05, 2022   1930 Impression: reproducible chest wall pain x 4 days; recent covid exposure; PERC 0  Plan: labs, covid/flu/rsv, trop, ecg, pain control, cxr, preg, reeval [RH]   2010 X-RAY CHEST 2 VIEWS  IMPRESSION:  No acute cardiopulmonary process. [RH]   2046 Labs unremarkable; potassium minimally low; troponin <2 and pain present for 4 days; COVID/RSV/FLU pending [RH]   2111 Unremarkable labs; stable for DC home; reproducible chest wall pain  with no history of tobacco abuse, otherwise healthy, p/w atypical chest pain ML of nonemergent etiology.    No overt risk factors for ACS  ECG without overt e/o STEMI, Brugada's sign, delta wave, epsilon wave, significantly prolonged QTc, or malignant arrhythmia.  Low Wells score with low risk for PE and no significant hypoxia.  Given chronicity and pain characteristics, low s/f dissection.  Low s/f dissection, no new mediastinal widening on CXR.  CXR without evidence of significant PTX. No PNA.    HEART Score: 0    Pain controlled, well appearing.  Cautious return precautions discussed with full understanding.  Prompt follow up with primary care physician discussed.    Discussed work up and plan  with Dr. Ramirez who is in agreement   [RH]      ED Course User Index  [RH] Nuha Glover PA C     Clinical Impression      Chest wall pain     Disposition  Discharge         Nuha Glover PA C  10/05/22 2117

## 2022-10-06 NOTE — DISCHARGE INSTRUCTIONS
Please call your primary care doctor to inform them of your ER visit today and arrange a follow up appointment within 2 days. Please return immediately to the emergency department for any new/worsening or concerning symptoms.     Take ibuprofen 600 mg every 6-8 hours as needed with food. Do not use more than 5-7 days.

## 2022-10-06 NOTE — ED ATTESTATION NOTE
I, Dr. Ramirez, was personally available for consultation in the Emergency Department. I have reviewed and agree with the assessment, treatment plan and disposition of the patient as recorded by the MLP.      Tasia Ramirez MD  10/05/22 6222

## 2022-10-07 LAB
ATRIAL RATE: 74
P AXIS: 64
PR INTERVAL: 154
QRS DURATION: 84
QT INTERVAL: 414
QTC CALCULATION(BAZETT): 459
R AXIS: 11
T WAVE AXIS: 51
VENTRICULAR RATE: 74

## 2022-12-01 ENCOUNTER — OPTICAL OFFICE (OUTPATIENT)
Dept: URBAN - METROPOLITAN AREA CLINIC 134 | Facility: CLINIC | Age: 42
Setting detail: OPHTHALMOLOGY
End: 2022-12-01
Payer: COMMERCIAL

## 2022-12-01 ENCOUNTER — DOCTOR'S OFFICE (OUTPATIENT)
Dept: URBAN - METROPOLITAN AREA CLINIC 125 | Facility: CLINIC | Age: 42
Setting detail: OPHTHALMOLOGY
End: 2022-12-01
Payer: COMMERCIAL

## 2022-12-01 DIAGNOSIS — H52.13: ICD-10-CM

## 2022-12-01 PROBLEM — G43.801 OCULAR MIGRAINE NEC/NOT INTRACTABLE: Status: ACTIVE | Noted: 2022-12-01

## 2022-12-01 PROCEDURE — 92015 DETERMINE REFRACTIVE STATE: CPT | Performed by: OPHTHALMOLOGY

## 2022-12-01 PROCEDURE — 92310 CONTACT LENS FITTING OU: CPT | Performed by: OPHTHALMOLOGY

## 2022-12-01 PROCEDURE — S0500 DISPOS CONT LENS: HCPCS | Performed by: OPHTHALMOLOGY

## 2022-12-01 PROCEDURE — 92014 COMPRE OPH EXAM EST PT 1/>: CPT | Performed by: OPHTHALMOLOGY

## 2022-12-01 ASSESSMENT — AXIALLENGTH_DERIVED
OD_AL: 26.1439
OS_AL: 26.3644
OS_AL: 26.6721
OD_AL: 25.3888

## 2022-12-01 ASSESSMENT — REFRACTION_AUTOREFRACTION
OD_SPHERE: -3.75
OS_CYLINDER: -1.00
OS_SPHERE: -5.75
OS_AXIS: 105
OD_CYLINDER: -2.25
OD_AXIS: 087

## 2022-12-01 ASSESSMENT — REFRACTION_MANIFEST
OD_SPHERE: -5.75
OS_VA1: 20/20
OS_SPHERE: -6.50
OS_AXIS: 107
OD_VA1: 20/20
OS_CYLINDER: -0.75
OD_VA2: 20/20
OU_VA: 20/20
OS_VA2: 20/20
OD_AXIS: 093
OD_CYLINDER: -1.50

## 2022-12-01 ASSESSMENT — REFRACTION_CURRENTRX
OD_SPHERE: -4.75
OS_OVR_VA: 20/
OD_OVR_VA: 20/
OS_CYLINDER: -0.75
OS_AXIS: 109
OD_CYLINDER: -1.50
OD_OVR_VA: 20/
OD_SPHERE: -5.25
OD_AXIS: 085
OS_SPHERE: -6.50
OS_OVR_VA: 20/
OS_SPHERE: -6.50

## 2022-12-01 ASSESSMENT — KERATOMETRY
OS_K1POWER_DIOPTERS: 43.00
OD_K2POWER_DIOPTERS: 44.75
OD_K1POWER_DIOPTERS: 43.50
OS_AXISANGLE_DEGREES: 002
OD_AXISANGLE_DEGREES: 002
OS_K2POWER_DIOPTERS: 43.75

## 2022-12-01 ASSESSMENT — SPHEQUIV_DERIVED
OD_SPHEQUIV: -4.875
OD_SPHEQUIV: -6.5
OS_SPHEQUIV: -6.25
OS_SPHEQUIV: -6.875

## 2022-12-01 ASSESSMENT — CONFRONTATIONAL VISUAL FIELD TEST (CVF)
OS_FINDINGS: FULL
OD_FINDINGS: FULL

## 2022-12-01 ASSESSMENT — DECREASING TEAR LAKE - SEVERITY SCORE
OS_DEC_TEARLAKE: 1+
OD_DEC_TEARLAKE: 1+

## 2022-12-01 ASSESSMENT — TEAR BREAK UP TIME (TBUT)
OS_TBUT: 2+
OD_TBUT: 2+

## 2022-12-01 ASSESSMENT — VISUAL ACUITY
OS_BCVA: 20/25-2
OD_BCVA: 20/25-2

## 2023-08-19 ENCOUNTER — APPOINTMENT (EMERGENCY)
Dept: RADIOLOGY | Facility: HOSPITAL | Age: 43
End: 2023-08-19
Attending: EMERGENCY MEDICINE
Payer: COMMERCIAL

## 2023-08-19 ENCOUNTER — HOSPITAL ENCOUNTER (EMERGENCY)
Facility: HOSPITAL | Age: 43
Discharge: HOME | End: 2023-08-19
Attending: EMERGENCY MEDICINE
Payer: COMMERCIAL

## 2023-08-19 VITALS
DIASTOLIC BLOOD PRESSURE: 71 MMHG | HEART RATE: 61 BPM | SYSTOLIC BLOOD PRESSURE: 116 MMHG | TEMPERATURE: 97.9 F | OXYGEN SATURATION: 99 % | RESPIRATION RATE: 20 BRPM

## 2023-08-19 DIAGNOSIS — S92.512A CLOSED DISPLACED FRACTURE OF PROXIMAL PHALANX OF LESSER TOE OF LEFT FOOT, INITIAL ENCOUNTER: Primary | ICD-10-CM

## 2023-08-19 PROCEDURE — 73630 X-RAY EXAM OF FOOT: CPT | Mod: RT

## 2023-08-19 PROCEDURE — 99283 EMERGENCY DEPT VISIT LOW MDM: CPT | Mod: 25

## 2023-08-20 NOTE — ED ATTESTATION NOTE
ED ATTENDING ATTESTATION NOTE  8/19/2023, 10:11 PM    I supervised the care provided by the NP (Jose). We have discussed the case. I have personally seen and examined Ann Rob.  I personally performed the key components of the encounter and have been involved in the care and medical decision making for this patient.    I reviewed and agree with the NP's assessment and plan of care, with any exceptions as documented below.      My focused history, examination, assessment, and plan of care of Ann Rob is as follows:    Brief History:  The patient presents with R pinky toe pain/injury. The pt. Reports she stubbed the toe on her child's bed frame, as she was putting them to sleep. There is obvious deformity, which the pt. reports attmepting to reduce with pulling PTA, but couldn't tolerate it.     Vital Signs Review: Vital signs have been reviewed. The initial oxygen saturation is SpO2: 99 % on room air. Interpretation: normal  ED Vitals    Date/Time Temp Pulse Resp BP SpO2 [facility]   08/19/23 2130 36.6 °C (97.9 °F) 61 20 116/71 99 % SW        Focused Physical Exam:   Constitutional:    Comments: Appears in no acute distress  HENT:   Head: Normocephalic and atraumatic.   Extremities:  RLE: TTP, swelling, and deformity (lateral angulation) at the 5th toe. NVSI distally.   Neurological:   Mental Status: Alert and oriented to person, place, and time. Mental status is at baseline. Grossly nonfocal.    Assessment / Plan / MDM:  R 5th toe injury with obvious deformity. X-ray right foot. F/u podiatry.     Medical Decision Making  Closed displaced fracture of proximal phalanx of lesser toe of left foot, initial encounter: acute illness or injury  Amount and/or Complexity of Data Reviewed  Radiology: ordered and independent interpretation performed.      I was physically present for the key/critical portions of the following procedures: closed reduction of displaced fracture using gentle inline traction, without sedation or  anesthetic.     *We are currently experiencing increased patient volumes surpassing departmental capacity.    *This document was created using dragon dictation software.  There might be some typographical errors due to this technology. S7         Diane Morales MD  08/31/23 9478

## 2023-08-20 NOTE — ED PROVIDER NOTES
HPI    Chief Complaint   Patient presents with    Toe Injury        HPI   Patient is 42-year-old female presenting with right fifth toe pain after striking it on her child's bed 1 hour prior to arrival.  She does note mild external rotation.  She took 400 mg of ibuprofen prior to arrival.  She denies any other injury.  No prior surgeries to this foot or toe.      Past Medical History:   Diagnosis Date    Abnormal stress test 08/01/2021    Allergic rhinitis     Gestational diabetes          History reviewed. No pertinent surgical history.    Family History   Problem Relation Age of Onset    Prostate cancer Biological Father     Heart disease Biological Father     Hypertension Biological Father     Uterine cancer Maternal Grandmother     Diabetes Maternal Grandfather     Diabetes Paternal Grandmother     Hypertension Paternal Grandfather     Breast cancer Other     Colon cancer Father's Brother     Diabetes Mother's Brother        Social History     Tobacco Use    Smoking status: Never    Smokeless tobacco: Never   Substance Use Topics    Alcohol use: No    Drug use: No       Systems Reviewed from Nursing Triage:               Review of Systems   Musculoskeletal:        Right 5th toe injury, pain            ED Triage Vitals [08/19/23 2130]   Temp Heart Rate Resp BP SpO2   36.6 °C (97.9 °F) 61 20 116/71 99 %      Temp Source Heart Rate Source Patient Position BP Location FiO2 (%) (Set)   Temporal -- -- -- --       Vitals:    08/19/23 2130   BP: 116/71   Pulse: 61   Resp: 20   Temp: 36.6 °C (97.9 °F)   TempSrc: Temporal   SpO2: 99%                         Physical Exam  Vitals and nursing note reviewed.   Constitutional:       General: She is not in acute distress.     Appearance: She is not ill-appearing, toxic-appearing or diaphoretic.   Cardiovascular:      Pulses: Normal pulses.   Pulmonary:      Effort: Pulmonary effort is normal.   Musculoskeletal:      Right foot: Normal capillary refill.  Deformity (partial external rotation) and bony tenderness present. No swelling.   Skin:     Capillary Refill: Capillary refill takes less than 2 seconds.   Neurological:      Mental Status: She is alert and oriented to person, place, and time. Mental status is at baseline.              X-RAY FOOT RIGHT 3+ VIEWS   ED Interpretation   Partially displaced 5th proximal phalanx fracture          Labs Reviewed - No data to display    X-RAY FOOT RIGHT 3+ VIEWS   ED Interpretation   Partially displaced 5th proximal phalanx fracture            Procedures    Final diagnoses:   [S92.512A] Closed displaced fracture of proximal phalanx of lesser toe of left foot, initial encounter       ED Course & MDM     ED Course as of 08/19/23 2247   Sat Aug 19, 2023   2215 5th toe reduced, danna taped, post-op shoe [SW]   2235 I have reviewed the results of the studies performed today with the patient.  I have discussed outpatient follow-up.  The patient is understanding and agreeable with the plan and is comfortable with discharge.  Ambulating with a steady gait at this time.  Strict return instructions given. [SW]      ED Course User Index  [SW] Mavis Garcia CRNP           Medical Decision Making  Closed displaced fracture of proximal phalanx of lesser toe of left foot, initial encounter: acute illness or injury  Amount and/or Complexity of Data Reviewed  Radiology: ordered and independent interpretation performed.          10:47 PM      Impression/Medical Decision Making: Right fifth toe injury 1 hour PTA    Plan: X-ray, danna tape, postop shoe, follow-up with podiatry    Vital Signs Review: Vital signs have been reviewed. The oxygen saturation is  SpO2: 99 % which is normal.      MATTHEW Gil  8/19/2023      We are in a period of time with increased volumes and decreased capacity.     This document was created using dragon dictation software.  There might be some typographical errors due to this technology.     Mavis Garcia  MATTHEW  08/19/23 6672

## 2025-01-20 ENCOUNTER — OFFICE VISIT (OUTPATIENT)
Dept: FAMILY MEDICINE | Facility: CLINIC | Age: 45
End: 2025-01-20
Payer: COMMERCIAL

## 2025-01-20 VITALS
SYSTOLIC BLOOD PRESSURE: 122 MMHG | HEIGHT: 70 IN | OXYGEN SATURATION: 98 % | WEIGHT: 175 LBS | HEART RATE: 77 BPM | RESPIRATION RATE: 16 BRPM | DIASTOLIC BLOOD PRESSURE: 80 MMHG | TEMPERATURE: 98.1 F | BODY MASS INDEX: 25.05 KG/M2

## 2025-01-20 DIAGNOSIS — Z11.1 ENCOUNTER FOR SCREENING FOR RESPIRATORY TUBERCULOSIS: ICD-10-CM

## 2025-01-20 DIAGNOSIS — Z00.00 ANNUAL PHYSICAL EXAM: Primary | ICD-10-CM

## 2025-01-20 DIAGNOSIS — Z11.59 ENCOUNTER FOR HCV SCREENING TEST FOR LOW RISK PATIENT: ICD-10-CM

## 2025-01-20 DIAGNOSIS — Z12.31 ENCOUNTER FOR SCREENING MAMMOGRAM FOR MALIGNANT NEOPLASM OF BREAST: ICD-10-CM

## 2025-01-20 PROCEDURE — 3008F BODY MASS INDEX DOCD: CPT | Performed by: NURSE PRACTITIONER

## 2025-01-20 PROCEDURE — 99386 PREV VISIT NEW AGE 40-64: CPT | Performed by: NURSE PRACTITIONER

## 2025-01-20 RX ORDER — CETIRIZINE HYDROCHLORIDE 10 MG/1
CAPSULE, LIQUID FILLED ORAL
COMMUNITY

## 2025-01-20 ASSESSMENT — ENCOUNTER SYMPTOMS
CHEST TIGHTNESS: 0
SLEEP DISTURBANCE: 0
ARTHRALGIAS: 0
ABDOMINAL PAIN: 0
BLOOD IN STOOL: 0
SPEECH DIFFICULTY: 0
FEVER: 0
WOUND: 0
TROUBLE SWALLOWING: 0
DIZZINESS: 0
PALPITATIONS: 0
LIGHT-HEADEDNESS: 0
CONFUSION: 0
VOMITING: 0
EYE PAIN: 0
COUGH: 0
MYALGIAS: 0
BRUISES/BLEEDS EASILY: 0
SHORTNESS OF BREATH: 0
WEAKNESS: 0
FREQUENCY: 0
HEADACHES: 0
UNEXPECTED WEIGHT CHANGE: 0
DIFFICULTY URINATING: 0
TREMORS: 0
DIARRHEA: 0
NAUSEA: 0
NERVOUS/ANXIOUS: 0
VOICE CHANGE: 0
ABDOMINAL DISTENTION: 0
FATIGUE: 0
NUMBNESS: 0
DECREASED CONCENTRATION: 0
WHEEZING: 0
CONSTIPATION: 0
CHILLS: 0

## 2025-01-20 ASSESSMENT — PATIENT HEALTH QUESTIONNAIRE - PHQ9: SUM OF ALL RESPONSES TO PHQ9 QUESTIONS 1 & 2: 0

## 2025-01-20 NOTE — ASSESSMENT & PLAN NOTE
Mammogram slip issued  Fasting lab slip issued  Will complete adoption forms upon completion of marisol gold testing  Declines covid booster  She is fairly certain she had tdap when pregnant with her daughter  F/u 1 year annual PE

## 2025-01-20 NOTE — PROGRESS NOTES
Reason for visit:   Chief Complaint   Patient presents with    \[facility] [place] [facility]\"" Care       HPI  is a 44 y.o. female     HPI    Here to [facility].     Form completion: she and her  are foster parents, will be adopting their foster daughter (5yo, has been with them the last year)   GERD: well controlled with occasional TUMS   Family hx CAD: she started following with cardiology when she had palps when pregnant, she developed CP when starting new job, had stress test done which was abnormal so had cath done which was normal, ultimately felt to be stress. Follows with dr cruz/[facility]  History of breast cysts   History of gestational DM: diet controlled    . 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 Has two daughters (17 and 6yo), and then will be adopting her third daughter.     Due for mammogram.     UTD P[facility], [facility]    Has upcoming appt with dental     UTD optho     UTD derm for skin check     Doing elliptical for exercise, hopes to start using this in the morning. Also works out at the  during her daughters' swim classes.         Problem List:  Patient Active Problem List    Diagnosis Date Noted    Annual physical exam 01/20/2025    Chest pain 08/11/2021    Fibrocystic breast disease (FCBD) 10/29/2019       Surgical History:  Past Surgical History   Procedure Laterality Date    Left heart cath with coronary angiography N/A 8/12/2021    Performed by Ari Pereira MD at  [facility]       Social History:  Social History     Social History Narrative    Not on file       Family History:  Family History   Problem Relation Name Age of Onset    Prostate cancer Biological Father      Heart disease Biological Father      Hypertension Biological Father      Uterine cancer Maternal Grandmother      Diabetes Maternal Grandfather      Diabetes Paternal Grandmother      Hypertension Paternal Grandfather      Breast cancer Other Maternal great-aunt     Colon cancer Father's Brother      Diabetes Mother's  Brother         Allergies:  Patient has no known allergies.    Current Medications:  Current Outpatient Medications   Medication Sig Dispense Refill    cetirizine (ZyrTEC) 10 mg capsule ZYRTEC ALLERGY 10 MG CAPS      multivitamin tablet Take 1 tablet by mouth every morning.         No current facility-administered medications for this visit.         Review of Systems:  Review of Systems   Constitutional:  Negative for chills, fatigue, fever and unexpected weight change.   HENT:  Negative for congestion, ear pain, hearing loss, trouble swallowing and voice change.    Eyes:  Negative for pain and visual disturbance.   Respiratory:  Negative for cough, chest tightness, shortness of breath and wheezing.    Cardiovascular:  Negative for chest pain, palpitations and leg swelling.   Gastrointestinal:  Negative for abdominal distention, abdominal pain, blood in stool, constipation, diarrhea, nausea and vomiting.   Genitourinary:  Negative for difficulty urinating, frequency and urgency.   Musculoskeletal:  Negative for arthralgias, gait problem and myalgias.   Skin:  Negative for rash and wound.   Neurological:  Negative for dizziness, tremors, speech difficulty, weakness, light-headedness, numbness and headaches.   Hematological:  Does not bruise/bleed easily.   Psychiatric/Behavioral:  Negative for behavioral problems, confusion, decreased concentration, sleep disturbance and suicidal ideas. The patient is not nervous/anxious.        Objective     Vital Signs:  Vitals:    01/20/25 0912   BP: 122/80   Pulse: 77   Resp: 16   Temp: 36.7 °C (98.1 °F)   SpO2: 98%       BMI:  Body mass index is 25.29 kg/m².     Physical Exam  Constitutional:       Appearance: Normal appearance. She is normal weight.   HENT:      Head: Normocephalic.      Right Ear: Tympanic membrane, ear canal and external ear normal.      Left Ear: Tympanic membrane, ear canal and external ear normal.      Nose: Nose normal.      Mouth/Throat:      Mouth:  Mucous membranes are moist.      Pharynx: Oropharynx is clear.   Eyes:      General: No scleral icterus.     Extraocular Movements: Extraocular movements intact.      Conjunctiva/sclera: Conjunctivae normal.      Pupils: Pupils are equal, round, and reactive to light.   Neck:      Vascular: No carotid bruit.   Cardiovascular:      Rate and Rhythm: Normal rate and regular rhythm.      Pulses: Normal pulses.      Heart sounds: No murmur heard.  Pulmonary:      Effort: Pulmonary effort is normal. No respiratory distress.      Breath sounds: Normal breath sounds. No wheezing, rhonchi or rales.   Abdominal:      General: Abdomen is flat. Bowel sounds are normal. There is no distension.      Palpations: Abdomen is soft.      Tenderness: There is no abdominal tenderness.      Hernia: No hernia is present.   Musculoskeletal:         General: No tenderness, deformity or signs of injury. Normal range of motion.      Cervical back: Normal range of motion and neck supple. No tenderness.      Right lower leg: No edema.      Left lower leg: No edema.   Skin:     General: Skin is warm and dry.      Findings: No lesion or rash.   Neurological:      General: No focal deficit present.      Mental Status: She is alert and oriented to person, place, and time. Mental status is at baseline.      Motor: No weakness.   Psychiatric:         Mood and Affect: Mood normal.         Behavior: Behavior normal.         Recent labs before today:     Lab Results   Component Value Date    WBC 7.45 10/05/2022    HGB 14.0 10/05/2022    HCT 42.8 10/05/2022     10/05/2022    CHOL 230 (H) 08/11/2021    TRIG 188 (H) 08/11/2021    HDL 48 (L) 08/11/2021    ALT 17 10/05/2022    AST 21 10/05/2022     10/05/2022    K 3.4 (L) 10/05/2022    CL 99 10/05/2022    CREATININE 0.8 10/05/2022    BUN 11 10/05/2022    CO2 28 10/05/2022    INR 1.0 08/12/2021       There are no Patient Instructions on file for this visit.  If you do not hear from me regarding  results in 7-10 days either via a call or the portal please call.      Problem List Items Addressed This Visit       Annual physical exam - Primary     Mammogram slip issued  Fasting lab slip issued  Will complete adoption forms upon completion of marisol gold testing  Declines covid booster  She is fairly certain she had tdap when pregnant with her daughter  F/u 1 year annual PE          Relevant Orders    CBC and differential    Comprehensive metabolic panel    Hemoglobin A1c    Lipid panel    TSH w reflex FT4    Vitamin D 25 hydroxy     Other Visit Diagnoses       Encounter for screening mammogram for malignant neoplasm of breast        Relevant Orders    BI SCREENING MAMMOGRAM BILATERAL(TOMOSYNTHESIS)    Encounter for HCV screening test for low risk patient        Relevant Orders    CBC and differential    Comprehensive metabolic panel    Hemoglobin A1c    Lipid panel    TSH w reflex FT4    Vitamin D 25 hydroxy    Hepatitis C Antibody With Reflex    Encounter for screening for respiratory tuberculosis        Relevant Orders    QuantiFERON-TB                 MATTHEW Morris  1/20/2025

## 2025-02-18 LAB
25(OH)D3+25(OH)D2 SERPL-MCNC: 30 NG/ML (ref 30–100)
ALBUMIN SERPL-MCNC: 4.3 G/DL (ref 3.6–5.1)
ALBUMIN/GLOB SERPL: 1.7 (CALC) (ref 1–2.5)
ALP SERPL-CCNC: 52 U/L (ref 31–125)
ALT SERPL-CCNC: 11 U/L (ref 6–29)
AST SERPL-CCNC: 13 U/L (ref 10–30)
BASOPHILS # BLD AUTO: 31 CELLS/UL (ref 0–200)
BASOPHILS NFR BLD AUTO: 0.5 %
BILIRUB SERPL-MCNC: 0.4 MG/DL (ref 0.2–1.2)
BUN SERPL-MCNC: 13 MG/DL (ref 7–25)
BUN/CREAT SERPL: NORMAL (CALC) (ref 6–22)
CALCIUM SERPL-MCNC: 9 MG/DL (ref 8.6–10.2)
CHLORIDE SERPL-SCNC: 104 MMOL/L (ref 98–110)
CHOLEST SERPL-MCNC: 212 MG/DL
CHOLEST/HDLC SERPL: 4.8 (CALC)
CO2 SERPL-SCNC: 25 MMOL/L (ref 20–32)
CREAT SERPL-MCNC: 0.77 MG/DL (ref 0.5–0.99)
EGFRCR SERPLBLD CKD-EPI 2021: 99 ML/MIN/1.73M2
EOSINOPHIL # BLD AUTO: 112 CELLS/UL (ref 15–500)
EOSINOPHIL NFR BLD AUTO: 1.8 %
ERYTHROCYTE [DISTWIDTH] IN BLOOD BY AUTOMATED COUNT: 13.2 % (ref 11–15)
GAMMA INTERFERON BACKGROUND BLD IA-ACNC: 0.01 IU/ML
GLOBULIN SER CALC-MCNC: 2.5 G/DL (CALC) (ref 1.9–3.7)
GLUCOSE SERPL-MCNC: 93 MG/DL (ref 65–99)
HBA1C MFR BLD: 5.6 % OF TOTAL HGB
HCT VFR BLD AUTO: 37.6 % (ref 35–45)
HCV AB SERPL QL IA: NORMAL
HDLC SERPL-MCNC: 44 MG/DL
HGB BLD-MCNC: 12.3 G/DL (ref 11.7–15.5)
LDLC SERPL CALC-MCNC: 150 MG/DL (CALC)
LYMPHOCYTES # BLD AUTO: 1835 CELLS/UL (ref 850–3900)
LYMPHOCYTES NFR BLD AUTO: 29.6 %
M TB IFN-G BLD-IMP: NEGATIVE
M TB IFN-G CD4+ BCKGRND COR BLD-ACNC: 0 IU/ML
M TB IFN-G CD4+CD8+ BCKGRND COR BLD-ACNC: 0 IU/ML
MCH RBC QN AUTO: 28.2 PG (ref 27–33)
MCHC RBC AUTO-ENTMCNC: 32.7 G/DL (ref 32–36)
MCV RBC AUTO: 86.2 FL (ref 80–100)
MITOGEN IGNF BCKGRD COR BLD-ACNC: 8.26 IU/ML
MONOCYTES # BLD AUTO: 310 CELLS/UL (ref 200–950)
MONOCYTES NFR BLD AUTO: 5 %
NEUTROPHILS # BLD AUTO: 3912 CELLS/UL (ref 1500–7800)
NEUTROPHILS NFR BLD AUTO: 63.1 %
NONHDLC SERPL-MCNC: 168 MG/DL (CALC)
PLATELET # BLD AUTO: 284 THOUSAND/UL (ref 140–400)
PMV BLD REES-ECKER: 10.2 FL (ref 7.5–12.5)
POTASSIUM SERPL-SCNC: 4.2 MMOL/L (ref 3.5–5.3)
PROT SERPL-MCNC: 6.8 G/DL (ref 6.1–8.1)
RBC # BLD AUTO: 4.36 MILLION/UL (ref 3.8–5.1)
SODIUM SERPL-SCNC: 136 MMOL/L (ref 135–146)
TRIGL SERPL-MCNC: 79 MG/DL
TSH SERPL-ACNC: 2.06 MIU/L
WBC # BLD AUTO: 6.2 THOUSAND/UL (ref 3.8–10.8)

## (undated) DEVICE — TR BAND REGULAR

## (undated) DEVICE — CATH 6FR FL3.5

## (undated) DEVICE — KIT CATH LAB ANGIO

## (undated) DEVICE — GUIDEWIRE DIAGNOSTIC 035-260 EXCHANGE

## (undated) DEVICE — CATH 6FR PIG 145 ANGLE

## (undated) DEVICE — GLIDESHEATH SLENDER SS (.021) 6FR 10CM

## (undated) DEVICE — CATH D 6F FR4 100CM